# Patient Record
Sex: MALE | Race: WHITE | NOT HISPANIC OR LATINO | Employment: UNEMPLOYED | URBAN - NONMETROPOLITAN AREA
[De-identification: names, ages, dates, MRNs, and addresses within clinical notes are randomized per-mention and may not be internally consistent; named-entity substitution may affect disease eponyms.]

---

## 2019-07-11 ENCOUNTER — TRANSCRIBE ORDERS (OUTPATIENT)
Dept: ADMINISTRATIVE | Facility: HOSPITAL | Age: 59
End: 2019-07-11

## 2019-07-11 DIAGNOSIS — R55 SYNCOPE AND COLLAPSE: Primary | ICD-10-CM

## 2019-07-15 ENCOUNTER — HOSPITAL ENCOUNTER (OUTPATIENT)
Dept: CT IMAGING | Facility: HOSPITAL | Age: 59
Discharge: HOME | End: 2019-07-15

## 2019-07-15 ENCOUNTER — TRANSCRIBE ORDERS (OUTPATIENT)
Dept: ADMINISTRATIVE | Facility: HOSPITAL | Age: 59
End: 2019-07-15

## 2019-07-15 ENCOUNTER — LAB (OUTPATIENT)
Dept: LAB | Facility: HOSPITAL | Age: 59
End: 2019-07-15

## 2019-07-15 ENCOUNTER — HOSPITAL ENCOUNTER (OUTPATIENT)
Dept: CARDIOLOGY | Facility: HOSPITAL | Age: 59
Discharge: HOME | End: 2019-07-15
Admitting: PHYSICIAN ASSISTANT

## 2019-07-15 DIAGNOSIS — E11.9 DIABETES MELLITUS, STABLE (HCC): ICD-10-CM

## 2019-07-15 DIAGNOSIS — I10 ESSENTIAL HYPERTENSION, BENIGN: ICD-10-CM

## 2019-07-15 DIAGNOSIS — I10 ESSENTIAL HYPERTENSION, BENIGN: Primary | ICD-10-CM

## 2019-07-15 DIAGNOSIS — R55 SYNCOPE AND COLLAPSE: ICD-10-CM

## 2019-07-15 LAB — CREAT BLDA-MCNC: 0.8 MG/DL (ref 0.6–1.3)

## 2019-07-15 PROCEDURE — 82565 ASSAY OF CREATININE: CPT

## 2019-07-15 PROCEDURE — 0 IOVERSOL 68 % SOLUTION: Performed by: PHYSICIAN ASSISTANT

## 2019-07-15 PROCEDURE — 36415 COLL VENOUS BLD VENIPUNCTURE: CPT

## 2019-07-15 PROCEDURE — 84443 ASSAY THYROID STIM HORMONE: CPT

## 2019-07-15 PROCEDURE — 85007 BL SMEAR W/DIFF WBC COUNT: CPT

## 2019-07-15 PROCEDURE — 83036 HEMOGLOBIN GLYCOSYLATED A1C: CPT

## 2019-07-15 PROCEDURE — 70470 CT HEAD/BRAIN W/O & W/DYE: CPT

## 2019-07-15 PROCEDURE — 85025 COMPLETE CBC W/AUTO DIFF WBC: CPT

## 2019-07-15 PROCEDURE — 80053 COMPREHEN METABOLIC PANEL: CPT

## 2019-07-15 PROCEDURE — 80061 LIPID PANEL: CPT

## 2019-07-15 PROCEDURE — 70470 CT HEAD/BRAIN W/O & W/DYE: CPT | Performed by: RADIOLOGY

## 2019-07-15 PROCEDURE — 93880 EXTRACRANIAL BILAT STUDY: CPT

## 2019-07-15 PROCEDURE — 93880 EXTRACRANIAL BILAT STUDY: CPT | Performed by: RADIOLOGY

## 2019-07-15 RX ADMIN — IOVERSOL 100 ML: 678 INJECTION INTRA-ARTERIAL; INTRAVENOUS at 13:26

## 2019-07-16 LAB
ALBUMIN SERPL-MCNC: 3.6 G/DL (ref 3.5–5.2)
ALBUMIN/GLOB SERPL: 0.8 G/DL
ALP SERPL-CCNC: 123 U/L (ref 39–117)
ALT SERPL W P-5'-P-CCNC: 39 U/L (ref 1–41)
ANION GAP SERPL CALCULATED.3IONS-SCNC: 12.5 MMOL/L (ref 5–15)
AST SERPL-CCNC: 95 U/L (ref 1–40)
BASOPHILS # BLD MANUAL: 0.05 10*3/MM3 (ref 0–0.2)
BASOPHILS NFR BLD AUTO: 1.1 % (ref 0–1.5)
BILIRUB SERPL-MCNC: 2.3 MG/DL (ref 0.2–1.2)
BUN BLD-MCNC: 11 MG/DL (ref 6–20)
BUN/CREAT SERPL: 13.4 (ref 7–25)
CALCIUM SPEC-SCNC: 10.1 MG/DL (ref 8.6–10.5)
CHLORIDE SERPL-SCNC: 98 MMOL/L (ref 98–107)
CHOLEST SERPL-MCNC: 142 MG/DL (ref 0–200)
CO2 SERPL-SCNC: 27.5 MMOL/L (ref 22–29)
CREAT BLD-MCNC: 0.82 MG/DL (ref 0.76–1.27)
DEPRECATED RDW RBC AUTO: 57.4 FL (ref 37–54)
ERYTHROCYTE [DISTWIDTH] IN BLOOD BY AUTOMATED COUNT: 14.6 % (ref 12.3–15.4)
GFR SERPL CREATININE-BSD FRML MDRD: 96 ML/MIN/1.73
GLOBULIN UR ELPH-MCNC: 4.6 GM/DL
GLUCOSE BLD-MCNC: 144 MG/DL (ref 65–99)
HBA1C MFR BLD: 6 % (ref 4.8–5.6)
HCT VFR BLD AUTO: 43.7 % (ref 37.5–51)
HDLC SERPL-MCNC: 39 MG/DL (ref 40–60)
HGB BLD-MCNC: 14.2 G/DL (ref 13–17.7)
LDLC SERPL CALC-MCNC: 80 MG/DL (ref 0–100)
LDLC/HDLC SERPL: 2.06 {RATIO}
LYMPHOCYTES # BLD MANUAL: 1.63 10*3/MM3 (ref 0.7–3.1)
LYMPHOCYTES NFR BLD MANUAL: 18.1 % (ref 5–12)
LYMPHOCYTES NFR BLD MANUAL: 36.2 % (ref 19.6–45.3)
MCH RBC QN AUTO: 34.4 PG (ref 26.6–33)
MCHC RBC AUTO-ENTMCNC: 32.5 G/DL (ref 31.5–35.7)
MCV RBC AUTO: 105.8 FL (ref 79–97)
MONOCYTES # BLD AUTO: 0.81 10*3/MM3 (ref 0.1–0.9)
NEUTROPHILS # BLD AUTO: 2.01 10*3/MM3 (ref 1.7–7)
NEUTROPHILS NFR BLD MANUAL: 44.7 % (ref 42.7–76)
PLAT MORPH BLD: NORMAL
PLATELET # BLD AUTO: 66 10*3/MM3 (ref 140–450)
PMV BLD AUTO: 12.5 FL (ref 6–12)
POTASSIUM BLD-SCNC: 4 MMOL/L (ref 3.5–5.2)
PROT SERPL-MCNC: 8.2 G/DL (ref 6–8.5)
RBC # BLD AUTO: 4.13 10*6/MM3 (ref 4.14–5.8)
RBC MORPH BLD: NORMAL
SODIUM BLD-SCNC: 138 MMOL/L (ref 136–145)
TRIGL SERPL-MCNC: 114 MG/DL (ref 0–150)
TSH SERPL DL<=0.05 MIU/L-ACNC: 1.91 MIU/ML (ref 0.27–4.2)
VLDLC SERPL-MCNC: 22.8 MG/DL (ref 5–40)
WBC MORPH BLD: NORMAL
WBC NRBC COR # BLD: 4.49 10*3/MM3 (ref 3.4–10.8)

## 2019-08-05 ENCOUNTER — OFFICE VISIT (OUTPATIENT)
Dept: CARDIOLOGY | Facility: CLINIC | Age: 59
End: 2019-08-05

## 2019-08-05 VITALS
DIASTOLIC BLOOD PRESSURE: 87 MMHG | WEIGHT: 202.2 LBS | SYSTOLIC BLOOD PRESSURE: 159 MMHG | HEART RATE: 85 BPM | HEIGHT: 67 IN | BODY MASS INDEX: 31.74 KG/M2 | RESPIRATION RATE: 18 BRPM | OXYGEN SATURATION: 97 %

## 2019-08-05 DIAGNOSIS — R07.2 PRECORDIAL PAIN: Primary | ICD-10-CM

## 2019-08-05 DIAGNOSIS — E11.9 TYPE 2 DIABETES MELLITUS WITHOUT COMPLICATION, WITHOUT LONG-TERM CURRENT USE OF INSULIN (HCC): ICD-10-CM

## 2019-08-05 DIAGNOSIS — R01.1 HEART MURMUR: ICD-10-CM

## 2019-08-05 DIAGNOSIS — R06.09 DYSPNEA ON EXERTION: ICD-10-CM

## 2019-08-05 DIAGNOSIS — I10 ESSENTIAL HYPERTENSION: ICD-10-CM

## 2019-08-05 PROCEDURE — 99204 OFFICE O/P NEW MOD 45 MIN: CPT | Performed by: INTERNAL MEDICINE

## 2019-08-05 PROCEDURE — 93000 ELECTROCARDIOGRAM COMPLETE: CPT | Performed by: INTERNAL MEDICINE

## 2019-08-05 RX ORDER — ATORVASTATIN CALCIUM 10 MG/1
TABLET, FILM COATED ORAL
Refills: 3 | COMMUNITY
Start: 2019-07-12 | End: 2019-08-27 | Stop reason: SDUPTHER

## 2019-08-05 RX ORDER — LISINOPRIL 20 MG/1
TABLET ORAL
Refills: 3 | COMMUNITY
Start: 2019-07-12 | End: 2019-12-03 | Stop reason: SDUPTHER

## 2019-08-05 RX ORDER — PROPRANOLOL HYDROCHLORIDE 10 MG/1
10 TABLET ORAL 2 TIMES DAILY
Qty: 60 TABLET | Refills: 3 | Status: SHIPPED | OUTPATIENT
Start: 2019-08-05 | End: 2020-02-06

## 2019-08-05 NOTE — PROGRESS NOTES
Darrell Guzman PA  Darrell Conteh  1960  08/05/2019    Patient Active Problem List   Diagnosis   • Precordial pain with some typical and some atypical features for angina pectoris.   • Dyspnea on exertion (NYHA class III)   • Heart murmur (systolic)   • Essential hypertension with elevated systolic blood pressure.   • Type 2 diabetes mellitus without complication, without long-term current use of insulin (CMS/[facility])       Dear Darrell:    Subjective     Darrell Conteh is a 59 y.o. male with the problems as listed above, presents    Chief complaint: Recent diagnosis of heart murmur and intermittent chest pains and shortness of breath.    History of Present Illness: Mr. Conteh is a pleasant 5-year-old  male with no history of known coronary artery disease, has history of hypertension and type 2 diabetes mellitus, has been noncompliant with taking his medications, has history of smoking of about 1 to 2 packs a day and has been a smoker for 40 years, history of alcohol abuse of 6 to 12 cans of beer a day for the last 35 to 40 years, presents with complaints of having some intermittent chest pains and shortness of breath.  He described the chest pains as being felt as sharp pains and sometimes burning type of sensation in the substernal region associated with some shortness of breath.  These chest pain seem to occur with and without exertion and relieved spontaneously.  There are mild to moderate intensity.  He was recently diagnosed to have a heart murmur by his PCP.  He denies any history of rheumatic heart disease.  No history of known valvular disease.  He has dyspnea with mild to moderate exertion with no PND or orthopnea.  He has some bilateral leg edema.  He does have a family history of premature coronary artery disease with his father having had heart attack in his 30s.  There is family history of strokes as well.    Cardiac risk factors:diabetes mellitus, hypertension, smoking, Positive family Hx. of premature  athersclerotivc disease., Sedentary life style, Obesity and Age and male gender.    Allergies   Allergen Reactions   • Morphine Other (See Comments)     PATIENT STATES FLATLINED       Current Outpatient Medications:   •  atorvastatin (LIPITOR) 10 MG tablet, , Disp: , Rfl: 3  •  lisinopril (PRINIVIL,ZESTRIL) 20 MG tablet, , Disp: , Rfl: 3  •  metFORMIN (GLUCOPHAGE) 500 MG tablet, , Disp: , Rfl: 3  •  propranolol (INDERAL) 10 MG tablet, Take 1 tablet by mouth 2 (Two) Times a Day., Disp: 60 tablet, Rfl: 3    Past Medical History:   Diagnosis Date   • Diabetes mellitus (CMS/HCC)    • Heart murmur    • Hypertension      History reviewed. No pertinent surgical history.  Family History   Problem Relation Age of Onset   • Alzheimer's disease Mother    • Heart disease Father    • Diabetes Father    • Stroke Father      Social History     Tobacco Use   • Smoking status: Current Every Day Smoker     Packs/day: 1.00     Years: 40.00     Pack years: 40.00     Types: Cigarettes   • Smokeless tobacco: Never Used   Substance Use Topics   • Alcohol use: Yes     Frequency: Never   • Drug use: No     Review of Systems   Constitution: Negative for chills, diaphoresis and fever.   HENT: Positive for tinnitus.    Eyes: Negative.    Cardiovascular: Negative for chest pain, leg swelling, orthopnea, palpitations and paroxysmal nocturnal dyspnea.   Respiratory: Negative for cough, hemoptysis and shortness of breath.    Endocrine: Negative for cold intolerance and heat intolerance.   Hematologic/Lymphatic: Does not bruise/bleed easily.   Skin: Negative for itching and rash.   Musculoskeletal: Positive for back pain, joint pain, joint swelling and muscle weakness. Negative for myalgias.   Gastrointestinal: Positive for change in bowel habit, diarrhea and heartburn. Negative for abdominal pain, constipation, nausea and vomiting.   Genitourinary: Positive for frequency. Negative for dysuria and hematuria.   Neurological: Positive for tremors.  "Negative for dizziness, focal weakness and numbness.   Psychiatric/Behavioral: Positive for memory loss.   Allergic/Immunologic: Negative.        Objective   Blood pressure 159/87, pulse 85, resp. rate 18, height 170.2 cm (67\"), weight 91.7 kg (202 lb 3.2 oz), SpO2 97 %.  Body mass index is 31.67 kg/m².      Physical Exam   Constitutional: He is oriented to person, place, and time. He appears well-developed and well-nourished.   HENT:   Mouth/Throat: Oropharynx is clear and moist.   Eyes: EOM are normal. Pupils are equal, round, and reactive to light.   Neck: Neck supple. No JVD present. No tracheal deviation present. No thyromegaly present.   Cardiovascular: Normal rate, regular rhythm, S1 normal and S2 normal. Exam reveals no gallop, no S3, no S4 and no friction rub.   Murmur heard.   Systolic murmur is present with a grade of 3/6 at the lower left sternal border.  Pulses:       Dorsalis pedis pulses are 1+ on the right side, and 1+ on the left side.        Posterior tibial pulses are 1+ on the right side, and 1+ on the left side.   Pulmonary/Chest: Effort normal and breath sounds normal.   Abdominal: Soft. Bowel sounds are normal. He exhibits no mass. There is no tenderness.   Musculoskeletal: Normal range of motion. He exhibits edema (1+ edema on both legs.).   Lymphadenopathy:     He has no cervical adenopathy.   Neurological: He is alert and oriented to person, place, and time.   Skin: Skin is warm and dry. No rash noted.   Psychiatric: He has a normal mood and affect.       Lab Results   Component Value Date     07/15/2019    K 4.0 07/15/2019    CL 98 07/15/2019    CO2 27.5 07/15/2019    BUN 11 07/15/2019    CREATININE 0.80 07/15/2019    GLUCOSE 144 (H) 07/15/2019    CALCIUM 10.1 07/15/2019    AST 95 (H) 07/15/2019    ALT 39 07/15/2019    ALKPHOS 123 (H) 07/15/2019     No results found for: CKTOTAL  Lab Results   Component Value Date    WBC 4.49 07/15/2019    HGB 14.2 07/15/2019    HCT 43.7 07/15/2019 "    PLT 66 (L) 07/15/2019     No results found for: INR  No results found for: MG  Lab Results   Component Value Date    TSH 1.910 07/15/2019    TRIG 114 07/15/2019    HDL 39 (L) 07/15/2019    LDL 80 07/15/2019        ECG 12 Lead  Date/Time: 8/5/2019 8:53 AM  Performed by: Owen Vazquez MD  Authorized by: Owen Vazquez MD   Previous ECG: no previous ECG available  Rhythm: sinus rhythm  Conduction: conduction normal  ST Segments: ST segments normal  Comments: Possible old inferior wall myocardial infarction.            Assessment/Plan :   Diagnosis Plan   1. Precordial pain with some typical and some atypical features for angina pectoris.  Stress Test With Myocardial Perfusion   2. Dyspnea on exertion (NYHA class III)  Adult Transthoracic Echo Complete   3. Heart murmur (systolic)  Adult Transthoracic Echo Complete   4. Essential hypertension with elevated systolic blood pressure.     5. Type 2 diabetes mellitus without complication, without long-term current use of insulin.      Recommendations:    Orders Placed This Encounter   Procedures   • Stress Test With Myocardial Perfusion (1 Day)   • ECG 12 Lead   • Adult Transthoracic Echo Complete W/ Cont if Necessary Per Protocol        1. I have also strongly emphasized for him to quit smoking and drinking alcohol.  2. We will evaluate him further with stress sestamibi study and echo Doppler study.  3. Continue with lisinopril and add propranolol 10 mg p.o. twice daily.    Return in about 4 weeks (around 9/2/2019).    As always, Darrell Guzman, PA  I appreciate very much the opportunity to participate in the cardiovascular care of your patients. Please do not hesitate to call me with any questions with regards to Darrell Conteh evaluation and management.       With Best Regards,        Owen Vazquez MD, FACC    Please note that portions of this note were completed with a voice recognition program.

## 2019-08-08 ENCOUNTER — TRANSCRIBE ORDERS (OUTPATIENT)
Dept: ADMINISTRATIVE | Facility: HOSPITAL | Age: 59
End: 2019-08-08

## 2019-08-08 DIAGNOSIS — R94.5 NONSPECIFIC ABNORMAL RESULTS OF LIVER FUNCTION STUDY: Primary | ICD-10-CM

## 2019-08-13 ENCOUNTER — HOSPITAL ENCOUNTER (OUTPATIENT)
Dept: ULTRASOUND IMAGING | Facility: HOSPITAL | Age: 59
Discharge: HOME | End: 2019-08-13
Admitting: PHYSICIAN ASSISTANT

## 2019-08-13 DIAGNOSIS — R94.5 NONSPECIFIC ABNORMAL RESULTS OF LIVER FUNCTION STUDY: ICD-10-CM

## 2019-08-13 PROCEDURE — 76705 ECHO EXAM OF ABDOMEN: CPT | Performed by: RADIOLOGY

## 2019-08-13 PROCEDURE — 76705 ECHO EXAM OF ABDOMEN: CPT

## 2019-08-20 ENCOUNTER — HOSPITAL ENCOUNTER (OUTPATIENT)
Dept: CARDIOLOGY | Facility: HOSPITAL | Age: 59
Discharge: HOME | End: 2019-08-20
Admitting: INTERNAL MEDICINE

## 2019-08-20 ENCOUNTER — HOSPITAL ENCOUNTER (OUTPATIENT)
Dept: NUCLEAR MEDICINE | Facility: HOSPITAL | Age: 59
Discharge: HOME | End: 2019-08-20

## 2019-08-20 ENCOUNTER — HOSPITAL ENCOUNTER (OUTPATIENT)
Dept: CARDIOLOGY | Facility: HOSPITAL | Age: 59
Discharge: HOME | End: 2019-08-20

## 2019-08-20 DIAGNOSIS — R07.2 PRECORDIAL PAIN: ICD-10-CM

## 2019-08-20 DIAGNOSIS — R06.09 DYSPNEA ON EXERTION: ICD-10-CM

## 2019-08-20 DIAGNOSIS — R01.1 HEART MURMUR: ICD-10-CM

## 2019-08-20 LAB
BH CV NUCLEAR PRIOR STUDY: 3
BH CV STRESS BP STAGE 1: NORMAL
BH CV STRESS BP STAGE 2: NORMAL
BH CV STRESS COMMENTS STAGE 1: NORMAL
BH CV STRESS COMMENTS STAGE 2: NORMAL
BH CV STRESS DOSE REGADENOSON STAGE 1: 0.4
BH CV STRESS DURATION MIN STAGE 1: 0
BH CV STRESS DURATION MIN STAGE 2: 4
BH CV STRESS DURATION SEC STAGE 1: 10
BH CV STRESS DURATION SEC STAGE 2: 0
BH CV STRESS HR STAGE 1: 99
BH CV STRESS HR STAGE 2: 100
BH CV STRESS PROTOCOL 1: NORMAL
BH CV STRESS RECOVERY BP: NORMAL MMHG
BH CV STRESS RECOVERY HR: 76 BPM
BH CV STRESS STAGE 1: 1
BH CV STRESS STAGE 2: 2
LV EF NUC BP: 67 %
MAXIMAL PREDICTED HEART RATE: 160 BPM
PERCENT MAX PREDICTED HR: 62.5 %
STRESS BASELINE BP: NORMAL MMHG
STRESS BASELINE HR: 73 BPM
STRESS PERCENT HR: 74 %
STRESS POST ESTIMATED WORKLOAD: 4.6 METS
STRESS POST EXERCISE DUR MIN: 3 MIN
STRESS POST EXERCISE DUR SEC: 7 SEC
STRESS POST PEAK BP: NORMAL MMHG
STRESS POST PEAK HR: 100 BPM
STRESS TARGET HR: 136 BPM

## 2019-08-20 PROCEDURE — 93017 CV STRESS TEST TRACING ONLY: CPT

## 2019-08-20 PROCEDURE — 78452 HT MUSCLE IMAGE SPECT MULT: CPT

## 2019-08-20 PROCEDURE — 25010000002 REGADENOSON 0.4 MG/5ML SOLUTION: Performed by: INTERNAL MEDICINE

## 2019-08-20 PROCEDURE — 78452 HT MUSCLE IMAGE SPECT MULT: CPT | Performed by: INTERNAL MEDICINE

## 2019-08-20 PROCEDURE — 0 TECHNETIUM SESTAMIBI: Performed by: INTERNAL MEDICINE

## 2019-08-20 PROCEDURE — 93018 CV STRESS TEST I&R ONLY: CPT | Performed by: INTERNAL MEDICINE

## 2019-08-20 PROCEDURE — 93306 TTE W/DOPPLER COMPLETE: CPT

## 2019-08-20 PROCEDURE — 93306 TTE W/DOPPLER COMPLETE: CPT | Performed by: INTERNAL MEDICINE

## 2019-08-20 PROCEDURE — A9500 TC99M SESTAMIBI: HCPCS | Performed by: INTERNAL MEDICINE

## 2019-08-20 RX ADMIN — REGADENOSON 0.4 MG: 0.08 INJECTION, SOLUTION INTRAVENOUS at 09:37

## 2019-08-20 RX ADMIN — TECHNETIUM TC 99M SESTAMIBI 1 DOSE: 1 INJECTION INTRAVENOUS at 09:37

## 2019-08-20 RX ADMIN — TECHNETIUM TC 99M SESTAMIBI 1 DOSE: 1 INJECTION INTRAVENOUS at 09:08

## 2019-08-21 LAB
BH CV ECHO MEAS - % IVS THICK: 41.6 %
BH CV ECHO MEAS - % LVPW THICK: 134.4 %
BH CV ECHO MEAS - ACS: 1.6 CM
BH CV ECHO MEAS - AO ROOT AREA (BSA CORRECTED): 1.5
BH CV ECHO MEAS - AO ROOT AREA: 7.5 CM^2
BH CV ECHO MEAS - AO ROOT DIAM: 3.1 CM
BH CV ECHO MEAS - BSA(HAYCOCK): 2.1 M^2
BH CV ECHO MEAS - BSA: 2 M^2
BH CV ECHO MEAS - BZI_BMI: 31.6 KILOGRAMS/M^2
BH CV ECHO MEAS - BZI_METRIC_HEIGHT: 170.2 CM
BH CV ECHO MEAS - BZI_METRIC_WEIGHT: 91.6 KG
BH CV ECHO MEAS - EDV(CUBED): 118 ML
BH CV ECHO MEAS - EDV(MOD-SP4): 68 ML
BH CV ECHO MEAS - EDV(TEICH): 113.1 ML
BH CV ECHO MEAS - EF(CUBED): 79.1 %
BH CV ECHO MEAS - EF(MOD-SP4): 72.1 %
BH CV ECHO MEAS - EF(TEICH): 71.3 %
BH CV ECHO MEAS - ESV(CUBED): 24.6 ML
BH CV ECHO MEAS - ESV(MOD-SP4): 19 ML
BH CV ECHO MEAS - ESV(TEICH): 32.5 ML
BH CV ECHO MEAS - FS: 40.7 %
BH CV ECHO MEAS - IVS/LVPW: 1.1
BH CV ECHO MEAS - IVSD: 1.2 CM
BH CV ECHO MEAS - IVSS: 1.6 CM
BH CV ECHO MEAS - LA DIMENSION: 4.7 CM
BH CV ECHO MEAS - LA/AO: 1.5
BH CV ECHO MEAS - LV DIASTOLIC VOL/BSA (35-75): 33.5 ML/M^2
BH CV ECHO MEAS - LV MASS(C)D: 197.6 GRAMS
BH CV ECHO MEAS - LV MASS(C)DI: 97.3 GRAMS/M^2
BH CV ECHO MEAS - LV MASS(C)S: 257 GRAMS
BH CV ECHO MEAS - LV MASS(C)SI: 126.6 GRAMS/M^2
BH CV ECHO MEAS - LV SYSTOLIC VOL/BSA (12-30): 9.4 ML/M^2
BH CV ECHO MEAS - LVIDD: 4.9 CM
BH CV ECHO MEAS - LVIDS: 2.9 CM
BH CV ECHO MEAS - LVLD AP4: 8.1 CM
BH CV ECHO MEAS - LVLS AP4: 6 CM
BH CV ECHO MEAS - LVOT AREA (M): 2.5 CM^2
BH CV ECHO MEAS - LVOT AREA: 2.5 CM^2
BH CV ECHO MEAS - LVOT DIAM: 1.8 CM
BH CV ECHO MEAS - LVPWD: 1 CM
BH CV ECHO MEAS - LVPWS: 2.4 CM
BH CV ECHO MEAS - MV A MAX VEL: 64.7 CM/SEC
BH CV ECHO MEAS - MV E MAX VEL: 121 CM/SEC
BH CV ECHO MEAS - MV E/A: 1.9
BH CV ECHO MEAS - PA ACC SLOPE: 1294 CM/SEC^2
BH CV ECHO MEAS - PA ACC TIME: 0.1 SEC
BH CV ECHO MEAS - PA PR(ACCEL): 34.5 MMHG
BH CV ECHO MEAS - RAP SYSTOLE: 10 MMHG
BH CV ECHO MEAS - RVDD: 2.4 CM
BH CV ECHO MEAS - RVSP: 44.3 MMHG
BH CV ECHO MEAS - SI(CUBED): 46 ML/M^2
BH CV ECHO MEAS - SI(MOD-SP4): 24.1 ML/M^2
BH CV ECHO MEAS - SI(TEICH): 39.7 ML/M^2
BH CV ECHO MEAS - SV(CUBED): 93.4 ML
BH CV ECHO MEAS - SV(MOD-SP4): 49 ML
BH CV ECHO MEAS - SV(TEICH): 80.6 ML
BH CV ECHO MEAS - TR MAX VEL: 293 CM/SEC
MAXIMAL PREDICTED HEART RATE: 160 BPM
STRESS TARGET HR: 136 BPM

## 2019-08-22 ENCOUNTER — TELEPHONE (OUTPATIENT)
Dept: CARDIOLOGY | Facility: CLINIC | Age: 59
End: 2019-08-22

## 2019-08-22 NOTE — TELEPHONE ENCOUNTER
Notes recorded by Kamila Morris APRN on 8/21/2019 at 9:31 AM EDT  Follow up within the next week to discuss abnormal stress test.      Notes recorded by Cristine Townsend CMA on 8/22/2019 at 9:00 AM EDT  Sooner appt per Kamila, date and time 8/27/19 @ 9AM.  Called pt, no answer and no VM set up.

## 2019-08-27 ENCOUNTER — OFFICE VISIT (OUTPATIENT)
Dept: CARDIOLOGY | Facility: CLINIC | Age: 59
End: 2019-08-27

## 2019-08-27 VITALS
SYSTOLIC BLOOD PRESSURE: 157 MMHG | HEART RATE: 75 BPM | HEIGHT: 67 IN | OXYGEN SATURATION: 98 % | WEIGHT: 208 LBS | BODY MASS INDEX: 32.65 KG/M2 | DIASTOLIC BLOOD PRESSURE: 80 MMHG

## 2019-08-27 DIAGNOSIS — E78.5 DYSLIPIDEMIA: ICD-10-CM

## 2019-08-27 DIAGNOSIS — E11.9 TYPE 2 DIABETES MELLITUS WITHOUT COMPLICATION, WITHOUT LONG-TERM CURRENT USE OF INSULIN (HCC): ICD-10-CM

## 2019-08-27 DIAGNOSIS — R07.2 PRECORDIAL PAIN: ICD-10-CM

## 2019-08-27 DIAGNOSIS — R94.39 ABNORMAL NUCLEAR STRESS TEST: ICD-10-CM

## 2019-08-27 DIAGNOSIS — I10 ESSENTIAL HYPERTENSION: Primary | ICD-10-CM

## 2019-08-27 PROCEDURE — 99214 OFFICE O/P EST MOD 30 MIN: CPT | Performed by: NURSE PRACTITIONER

## 2019-08-27 RX ORDER — ISOSORBIDE MONONITRATE 30 MG/1
30 TABLET, EXTENDED RELEASE ORAL DAILY
Qty: 30 TABLET | Refills: 5 | Status: SHIPPED | OUTPATIENT
Start: 2019-08-27 | End: 2019-09-17 | Stop reason: SDUPTHER

## 2019-08-27 RX ORDER — ATORVASTATIN CALCIUM 20 MG/1
20 TABLET, FILM COATED ORAL NIGHTLY
Qty: 30 TABLET | Refills: 5 | Status: SHIPPED | OUTPATIENT
Start: 2019-08-27

## 2019-08-27 NOTE — PROGRESS NOTES
Darrell Guzman PA  Darrell Conteh  1960  08/27/2019    Patient Active Problem List   Diagnosis   • Precordial pain with some typical and some atypical features for angina pectoris.   • Dyspnea on exertion (NYHA class III)   • Heart murmur (systolic)   • Essential hypertension with elevated systolic blood pressure.   • Type 2 diabetes mellitus without complication, without long-term current use of insulin (CMS/[facility])   • Abnormal nuclear stress test       Dear Darrell Guzman PA:    Subjective     Chief Complaint   Patient presents with   • Precordial pain with some typical and some atypical features           History of Present Illness:    Darrell Conteh is a 59 y.o. male with a history of hypertension, dyslipidemia, diabetes mellitus type 2, tobacco abuse, and family history of premature coronary artery disease.  He presents today for routine cardiology follow-up.  Initially, he was having frequent chest pains.  He was evaluated further with a Lexiscan stress test and echocardiogram.  The stress test revealed a medium size, mild degree of ischemia in the inferior wall consistent with an intermediate risk study.  Echocardiogram revealed a normal EF with no significant valvular abnormalities.  He reports he has occasional chest pains.  These are described as a pressure in the substernal region radiating to the left chest wall.  He has no associated symptoms.  This usually lasts for a few seconds.  He cannot identify any aggravating or alleviating factors.  He is unable to tell me how often this occurs.  He continues to smoke.          Allergies   Allergen Reactions   • Morphine Other (See Comments)     PATIENT STATES FLATLINED   :      Current Outpatient Medications:   •  atorvastatin (LIPITOR) 20 MG tablet, Take 1 tablet by mouth Every Night., Disp: 30 tablet, Rfl: 5  •  lisinopril (PRINIVIL,ZESTRIL) 20 MG tablet, , Disp: , Rfl: 3  •  metFORMIN (GLUCOPHAGE) 500 MG tablet, , Disp: , Rfl: 3  •  propranolol (INDERAL) 10 MG  "tablet, Take 1 tablet by mouth 2 (Two) Times a Day., Disp: 60 tablet, Rfl: 3  •  isosorbide mononitrate (IMDUR) 30 MG 24 hr tablet, Take 1 tablet by mouth Daily., Disp: 30 tablet, Rfl: 5      The following portions of the patient's history were reviewed and updated as appropriate: allergies, current medications, past family history, past medical history, past social history, past surgical history and problem list.    Social History     Tobacco Use   • Smoking status: Current Every Day Smoker     Packs/day: 1.00     Years: 40.00     Pack years: 40.00     Types: Cigarettes   • Smokeless tobacco: Never Used   Substance Use Topics   • Alcohol use: Yes     Frequency: Never   • Drug use: No       Review of Systems   Constitution: Negative for weakness and malaise/fatigue.   Cardiovascular: Positive for chest pain. Negative for dyspnea on exertion, irregular heartbeat, leg swelling, near-syncope, orthopnea, palpitations, paroxysmal nocturnal dyspnea and syncope.   Respiratory: Negative for cough, shortness of breath and wheezing.    Neurological: Negative for dizziness and light-headedness.       Objective   Vitals:    08/27/19 0904   BP: 157/80   Pulse: 75   SpO2: 98%   Weight: 94.3 kg (208 lb)   Height: 170.2 cm (67.01\")     Body mass index is 32.57 kg/m².        Physical Exam   Constitutional: He is oriented to person, place, and time. He appears well-developed and well-nourished.   HENT:   Head: Normocephalic and atraumatic.   Cardiovascular: Normal rate, regular rhythm and normal heart sounds. Exam reveals no S3 and no S4.   No murmur heard.  Pulmonary/Chest: Effort normal and breath sounds normal. He has no wheezes. He has no rales.   Abdominal: Soft. Bowel sounds are normal.   Musculoskeletal: He exhibits no edema.   Neurological: He is alert and oriented to person, place, and time.   Skin: Skin is warm and dry.   Psychiatric: He has a normal mood and affect. His behavior is normal.       Lab Results   Component " Value Date     07/15/2019    K 4.0 07/15/2019    CL 98 07/15/2019    CO2 27.5 07/15/2019    BUN 11 07/15/2019    CREATININE 0.80 07/15/2019    GLUCOSE 144 (H) 07/15/2019    CALCIUM 10.1 07/15/2019    AST 95 (H) 07/15/2019    ALT 39 07/15/2019    ALKPHOS 123 (H) 07/15/2019       Lab Results   Component Value Date    WBC 4.49 07/15/2019    HGB 14.2 07/15/2019    HCT 43.7 07/15/2019    PLT 66 (L) 07/15/2019       Lab Results   Component Value Date    TSH 1.910 07/15/2019    TRIG 114 07/15/2019    HDL 39 (L) 07/15/2019    LDL 80 07/15/2019            Procedures      Assessment/Plan    Diagnosis Plan   1. Essential hypertension with elevated systolic blood pressure.     2. Type 2 diabetes mellitus without complication, without long-term current use of insulin (CMS/[facility])     3. Precordial pain with some typical and some atypical features for angina pectoris.  atorvastatin (LIPITOR) 20 MG tablet    isosorbide mononitrate (IMDUR) 30 MG 24 hr tablet   4. Abnormal nuclear stress test  atorvastatin (LIPITOR) 20 MG tablet    isosorbide mononitrate (IMDUR) 30 MG 24 hr tablet   5. Dyslipidemia  atorvastatin (LIPITOR) 20 MG tablet                Recommendations:    1.  I have discussed stress test results and echocardiogram results with the patient at length today.    2.  Given his abnormal stress test with persistent chest pains and multiple cardiac risk factors I have recommended cardiac catheterization.  However, the patient has declined at this time.  He is wanting to try medical management for now.    3.  We will continue with lisinopril and propranolol.    4.  His recent LDL was not at goal, will increase atorvastatin to 20 mg every evening.    5.  Start  isosorbide 30 mg daily.    6.  I did give him literature regarding cardiac catheterizations for him to review at home and discussed the procedure with him at length today.    7.  Follow-up in 3 weeks and if needed.        Return in about 3 weeks (around 9/17/2019) for  Recheck.    As always, I appreciate very much the opportunity to participate in the cardiovascular care of your patients.      With Best Regards,    DEBRA Flores

## 2019-09-17 ENCOUNTER — OFFICE VISIT (OUTPATIENT)
Dept: CARDIOLOGY | Facility: CLINIC | Age: 59
End: 2019-09-17

## 2019-09-17 VITALS
OXYGEN SATURATION: 98 % | HEART RATE: 78 BPM | BODY MASS INDEX: 32.96 KG/M2 | HEIGHT: 67 IN | WEIGHT: 210 LBS | SYSTOLIC BLOOD PRESSURE: 156 MMHG | DIASTOLIC BLOOD PRESSURE: 80 MMHG

## 2019-09-17 DIAGNOSIS — I10 ESSENTIAL HYPERTENSION: Primary | ICD-10-CM

## 2019-09-17 DIAGNOSIS — E78.5 DYSLIPIDEMIA: ICD-10-CM

## 2019-09-17 DIAGNOSIS — E11.9 TYPE 2 DIABETES MELLITUS WITHOUT COMPLICATION, WITHOUT LONG-TERM CURRENT USE OF INSULIN (HCC): ICD-10-CM

## 2019-09-17 DIAGNOSIS — R94.39 ABNORMAL NUCLEAR STRESS TEST: ICD-10-CM

## 2019-09-17 DIAGNOSIS — R07.2 PRECORDIAL PAIN: ICD-10-CM

## 2019-09-17 PROCEDURE — 99213 OFFICE O/P EST LOW 20 MIN: CPT | Performed by: NURSE PRACTITIONER

## 2019-09-17 RX ORDER — ISOSORBIDE MONONITRATE 60 MG/1
60 TABLET, EXTENDED RELEASE ORAL DAILY
Qty: 30 TABLET | Refills: 5 | Status: SHIPPED | OUTPATIENT
Start: 2019-09-17

## 2019-09-17 NOTE — PROGRESS NOTES
Darrell Guzman PA  Darrell Conteh  1960  09/17/2019    Patient Active Problem List   Diagnosis   • Precordial pain with some typical and some atypical features for angina pectoris.   • Dyspnea on exertion (NYHA class III)   • Heart murmur (systolic)   • Essential hypertension with elevated systolic blood pressure.   • Type 2 diabetes mellitus without complication, without long-term current use of insulin (CMS/[facility])   • Abnormal nuclear stress test       Dear Darrell Guzman PA:    Subjective     Chief Complaint   Patient presents with   • Hypertension     3 week follow up           History of Present Illness:    Darrell Conteh is a 59 y.o. male with a history of hypertension, dyslipidemia, diabetes mellitus type 2, tobacco abuse, and family history of premature coronary artery disease.  He initially presented with chest pains.  He was evaluated with a Lexiscan stress test which revealed medium size, mild degree of ischemia in the inferior wall.  An echocardiogram revealed normal EF with no significant valvular abnormalities.  Cardiac catheterization was recommended.  However, the patient declined.  Therefore, he was treated medically.  He reports since starting isosorbide his chest pains have markedly improved.  He only recalls a couple of mild episodes of chest pain since his last visit.  He has some chronic shortness of breath with mild to moderate exertion which is not recently changed.  He also denies palpitations, dizziness, lightheadedness, near-syncope, or syncope.  He has not been taking an aspirin daily.        Allergies   Allergen Reactions   • Morphine Other (See Comments)     PATIENT STATES FLATLINED   :      Current Outpatient Medications:   •  atorvastatin (LIPITOR) 20 MG tablet, Take 1 tablet by mouth Every Night., Disp: 30 tablet, Rfl: 5  •  isosorbide mononitrate (IMDUR) 60 MG 24 hr tablet, Take 1 tablet by mouth Daily., Disp: 30 tablet, Rfl: 5  •  lisinopril (PRINIVIL,ZESTRIL) 20 MG tablet, , Disp: , Rfl:  "3  •  metFORMIN (GLUCOPHAGE) 500 MG tablet, , Disp: , Rfl: 3  •  propranolol (INDERAL) 10 MG tablet, Take 1 tablet by mouth 2 (Two) Times a Day., Disp: 60 tablet, Rfl: 3      The following portions of the patient's history were reviewed and updated as appropriate: allergies, current medications, past family history, past medical history, past social history, past surgical history and problem list.    Social History     Tobacco Use   • Smoking status: Current Every Day Smoker     Packs/day: 1.00     Years: 40.00     Pack years: 40.00     Types: Cigarettes   • Smokeless tobacco: Never Used   Substance Use Topics   • Alcohol use: Yes     Frequency: Never   • Drug use: No       Review of Systems   Constitution: Negative for weakness and malaise/fatigue.   Cardiovascular: Negative for chest pain, dyspnea on exertion, irregular heartbeat, leg swelling, near-syncope, orthopnea, palpitations, paroxysmal nocturnal dyspnea and syncope.   Respiratory: Negative for cough, shortness of breath and wheezing.    Neurological: Negative for dizziness and light-headedness.       Objective   Vitals:    09/17/19 0846   BP: 156/80   Pulse: 78   SpO2: 98%   Weight: 95.3 kg (210 lb)   Height: 170.2 cm (67.01\")     Body mass index is 32.88 kg/m².        Physical Exam   Constitutional: He is oriented to person, place, and time. He appears well-developed and well-nourished.   HENT:   Head: Normocephalic and atraumatic.   Cardiovascular: Normal rate, regular rhythm and normal heart sounds. Exam reveals no S3 and no S4.   No murmur heard.  Pulmonary/Chest: Effort normal and breath sounds normal. He has no wheezes. He has no rales.   Abdominal: Soft. Bowel sounds are normal.   Musculoskeletal: He exhibits no edema.   Neurological: He is alert and oriented to person, place, and time.   Skin: Skin is warm and dry.   Psychiatric: He has a normal mood and affect. His behavior is normal.       Lab Results   Component Value Date     07/15/2019 "    K 4.0 07/15/2019    CL 98 07/15/2019    CO2 27.5 07/15/2019    BUN 11 07/15/2019    CREATININE 0.80 07/15/2019    GLUCOSE 144 (H) 07/15/2019    CALCIUM 10.1 07/15/2019    AST 95 (H) 07/15/2019    ALT 39 07/15/2019    ALKPHOS 123 (H) 07/15/2019        Lab Results   Component Value Date    WBC 4.49 07/15/2019    HGB 14.2 07/15/2019    HCT 43.7 07/15/2019    PLT 66 (L) 07/15/2019        Lab Results   Component Value Date    TSH 1.910 07/15/2019    TRIG 114 07/15/2019    HDL 39 (L) 07/15/2019    LDL 80 07/15/2019         Procedures      Assessment/Plan    Diagnosis Plan   1. Essential hypertension with elevated systolic blood pressure.     2. Type 2 diabetes mellitus without complication, without long-term current use of insulin (CMS/[facility])     3. Precordial pain with some typical and some atypical features for angina pectoris.  isosorbide mononitrate (IMDUR) 60 MG 24 hr tablet   4. Abnormal nuclear stress test  isosorbide mononitrate (IMDUR) 60 MG 24 hr tablet   5. Dyslipidemia                  Recommendations:    1. Will increase Imdur to 60 mg daily.    2. I have stressed the importance of taking an aspirin 81 mg daily.    3. Continue propranolol, lisinopril, and atorvastatin.    4. Follow up in 2 months and PRN.      Return in about 2 months (around 11/17/2019) for Recheck.    As always, I appreciate very much the opportunity to participate in the cardiovascular care of your patients.      With Best Regards,    DEBRA Flores

## 2019-12-03 ENCOUNTER — OFFICE VISIT (OUTPATIENT)
Dept: CARDIOLOGY | Facility: CLINIC | Age: 59
End: 2019-12-03

## 2019-12-03 VITALS
BODY MASS INDEX: 31.71 KG/M2 | SYSTOLIC BLOOD PRESSURE: 168 MMHG | WEIGHT: 202 LBS | HEIGHT: 67 IN | RESPIRATION RATE: 16 BRPM | HEART RATE: 90 BPM | DIASTOLIC BLOOD PRESSURE: 91 MMHG

## 2019-12-03 DIAGNOSIS — I10 ESSENTIAL HYPERTENSION: Primary | ICD-10-CM

## 2019-12-03 DIAGNOSIS — R07.2 PRECORDIAL PAIN: ICD-10-CM

## 2019-12-03 DIAGNOSIS — E11.9 TYPE 2 DIABETES MELLITUS WITHOUT COMPLICATION, WITHOUT LONG-TERM CURRENT USE OF INSULIN (HCC): ICD-10-CM

## 2019-12-03 DIAGNOSIS — R94.39 ABNORMAL NUCLEAR STRESS TEST: ICD-10-CM

## 2019-12-03 PROCEDURE — 99214 OFFICE O/P EST MOD 30 MIN: CPT | Performed by: NURSE PRACTITIONER

## 2019-12-03 RX ORDER — RANOLAZINE 500 MG/1
500 TABLET, EXTENDED RELEASE ORAL 2 TIMES DAILY
Qty: 60 TABLET | Refills: 5 | Status: SHIPPED | OUTPATIENT
Start: 2019-12-03

## 2019-12-03 RX ORDER — LISINOPRIL 40 MG/1
40 TABLET ORAL DAILY
Qty: 30 TABLET | Refills: 5 | Status: SHIPPED | OUTPATIENT
Start: 2019-12-03

## 2019-12-03 NOTE — PROGRESS NOTES
Darrell Guzman PA  Darrell Conteh  1960  12/03/2019    Patient Active Problem List   Diagnosis   • Precordial pain with some typical and some atypical features for angina pectoris.   • Dyspnea on exertion (NYHA class III)   • Heart murmur (systolic)   • Essential hypertension with elevated systolic blood pressure.   • Type 2 diabetes mellitus without complication, without long-term current use of insulin (CMS/[facility])   • Abnormal nuclear stress test       Dear Darrell Guzman PA:    Subjective     Chief Complaint   Patient presents with   • Chest Pain   • Hypertension           History of Present Illness:    Darrell Conteh is a 59 y.o. male with a history of hypertension, dyslipidemia, diabetes mellitus type 2, tobacco abuse, and family history of premature coronary artery disease.  He presents today for routine cardiology follow-up.  He initially presented with chest pains and was evaluated with a Lexiscan stress test which revealed a medium size, mild degree of ischemia in the inferior wall.  Since that time, the patient is continued to have chest pains.  These typically occur with exertion and are alleviated with rest.  This is located in the substernal region.  He has previously refused cardiac catheterization and has been medically managed.  He continues to have chest pain and weakness.  He reports his activity level has been limited.  He denies any shortness of breath, palpitations, dizziness, or lightheadedness.  His blood pressure has been elevated.  He has not taken any of his medications this morning.          Allergies   Allergen Reactions   • Morphine Other (See Comments)     PATIENT STATES FLATLINED   :      Current Outpatient Medications:   •  atorvastatin (LIPITOR) 20 MG tablet, Take 1 tablet by mouth Every Night., Disp: 30 tablet, Rfl: 5  •  isosorbide mononitrate (IMDUR) 60 MG 24 hr tablet, Take 1 tablet by mouth Daily., Disp: 30 tablet, Rfl: 5  •  lisinopril (PRINIVIL,ZESTRIL) 40 MG tablet, Take 1 tablet by  "mouth Daily., Disp: 30 tablet, Rfl: 5  •  metFORMIN (GLUCOPHAGE) 500 MG tablet, , Disp: , Rfl: 3  •  propranolol (INDERAL) 10 MG tablet, Take 1 tablet by mouth 2 (Two) Times a Day., Disp: 60 tablet, Rfl: 3  •  ranolazine (RANEXA) 500 MG 12 hr tablet, Take 1 tablet by mouth 2 (Two) Times a Day., Disp: 60 tablet, Rfl: 5      The following portions of the patient's history were reviewed and updated as appropriate: allergies, current medications, past family history, past medical history, past social history, past surgical history and problem list.    Social History     Tobacco Use   • Smoking status: Current Every Day Smoker     Packs/day: 1.00     Years: 40.00     Pack years: 40.00     Types: Cigarettes   • Smokeless tobacco: Never Used   Substance Use Topics   • Alcohol use: Yes     Frequency: Never   • Drug use: No       Review of Systems   Constitution: Positive for weakness. Negative for malaise/fatigue.   Cardiovascular: Positive for chest pain. Negative for dyspnea on exertion, irregular heartbeat, leg swelling, near-syncope, orthopnea, palpitations, paroxysmal nocturnal dyspnea and syncope.   Respiratory: Negative for cough, shortness of breath and wheezing.    Neurological: Negative for dizziness and light-headedness.       Objective   Vitals:    12/03/19 0821   BP: 168/91   BP Location: Right arm   Pulse: 90   Resp: 16   Weight: 91.6 kg (202 lb)   Height: 170.2 cm (67.01\")     Body mass index is 31.63 kg/m².        Physical Exam   Constitutional: He is oriented to person, place, and time. He appears well-developed and well-nourished.   HENT:   Head: Normocephalic and atraumatic.   Cardiovascular: Normal rate, regular rhythm and normal heart sounds. Exam reveals no S3 and no S4.   No murmur heard.  Pulmonary/Chest: Effort normal and breath sounds normal. He has no wheezes. He has no rales.   Abdominal: Soft. Bowel sounds are normal.   Musculoskeletal: He exhibits no edema.   Neurological: He is alert and " oriented to person, place, and time.   Skin: Skin is warm and dry.   Psychiatric: He has a normal mood and affect. His behavior is normal.       Lab Results   Component Value Date     07/15/2019    K 4.0 07/15/2019    CL 98 07/15/2019    CO2 27.5 07/15/2019    BUN 11 07/15/2019    CREATININE 0.80 07/15/2019    GLUCOSE 144 (H) 07/15/2019    CALCIUM 10.1 07/15/2019    AST 95 (H) 07/15/2019    ALT 39 07/15/2019    ALKPHOS 123 (H) 07/15/2019      Lab Results   Component Value Date    WBC 4.49 07/15/2019    HGB 14.2 07/15/2019    HCT 43.7 07/15/2019    PLT 66 (L) 07/15/2019        Lab Results   Component Value Date    TSH 1.910 07/15/2019    TRIG 114 07/15/2019    HDL 39 (L) 07/15/2019    LDL 80 07/15/2019          Procedures      Assessment/Plan    Diagnosis Plan   1. Essential hypertension with elevated systolic blood pressure.  lisinopril (PRINIVIL,ZESTRIL) 40 MG tablet    Basic Metabolic Panel   2. Abnormal nuclear stress test     3. Type 2 diabetes mellitus without complication, without long-term current use of insulin (CMS/[facility])     4. Precordial pain with some typical and some atypical features for angina pectoris.  ranolazine (RANEXA) 500 MG 12 hr tablet                Recommendations:    1.  I have again discussed with him about cardiac catheterization for his persistent chest pains despite medical management and recently abnormal nuclear stress test.  However, he states he does not want to pursue this.    2.  We will increase lisinopril to 40 mg daily for his uncontrolled hypertension.    3.  Start Ranexa 500 mg twice daily for his chest pains.    4.  Continue low-dose aspirin, propranolol, Imdur, and atorvastatin.    5.  Follow-up in 2 months and if needed.        Return in about 2 months (around 2/3/2020) for Recheck.    As always, I appreciate very much the opportunity to participate in the cardiovascular care of your patients.      With Best Regards,    DEBRA Flores

## 2020-02-06 RX ORDER — PROPRANOLOL HYDROCHLORIDE 10 MG/1
TABLET ORAL
Qty: 60 TABLET | Refills: 1 | Status: SHIPPED | OUTPATIENT
Start: 2020-02-06

## 2020-04-03 ENCOUNTER — HOSPITAL ENCOUNTER (EMERGENCY)
Facility: HOSPITAL | Age: 60
Discharge: ANOTHER HOSPITAL | End: 2020-04-03
Attending: FAMILY MEDICINE | Admitting: FAMILY MEDICINE

## 2020-04-03 ENCOUNTER — APPOINTMENT (OUTPATIENT)
Dept: CT IMAGING | Facility: HOSPITAL | Age: 60
End: 2020-04-03

## 2020-04-03 ENCOUNTER — APPOINTMENT (OUTPATIENT)
Dept: GENERAL RADIOLOGY | Facility: HOSPITAL | Age: 60
End: 2020-04-03

## 2020-04-03 ENCOUNTER — APPOINTMENT (OUTPATIENT)
Dept: ULTRASOUND IMAGING | Facility: HOSPITAL | Age: 60
End: 2020-04-03

## 2020-04-03 VITALS
TEMPERATURE: 98.2 F | SYSTOLIC BLOOD PRESSURE: 94 MMHG | HEIGHT: 67 IN | BODY MASS INDEX: 31.39 KG/M2 | OXYGEN SATURATION: 99 % | DIASTOLIC BLOOD PRESSURE: 52 MMHG | RESPIRATION RATE: 18 BRPM | WEIGHT: 200 LBS | HEART RATE: 87 BPM

## 2020-04-03 DIAGNOSIS — K81.0 ACUTE CHOLECYSTITIS: Primary | ICD-10-CM

## 2020-04-03 DIAGNOSIS — J18.9 PNEUMONIA OF LEFT LOWER LOBE DUE TO INFECTIOUS ORGANISM: ICD-10-CM

## 2020-04-03 DIAGNOSIS — E80.6 HYPERBILIRUBINEMIA: ICD-10-CM

## 2020-04-03 DIAGNOSIS — R11.2 NAUSEA AND VOMITING IN ADULT: ICD-10-CM

## 2020-04-03 DIAGNOSIS — F10.10 ALCOHOL ABUSE: ICD-10-CM

## 2020-04-03 LAB
ALBUMIN SERPL-MCNC: 1.97 G/DL (ref 3.5–5.2)
ALBUMIN/GLOB SERPL: 0.4 G/DL
ALP SERPL-CCNC: 359 U/L (ref 39–117)
ALT SERPL W P-5'-P-CCNC: 106 U/L (ref 1–41)
AMMONIA BLD-SCNC: 60 UMOL/L (ref 16–60)
ANION GAP SERPL CALCULATED.3IONS-SCNC: 16.5 MMOL/L (ref 5–15)
AST SERPL-CCNC: 317 U/L (ref 1–40)
B PERT DNA SPEC QL NAA+PROBE: NOT DETECTED
BACTERIA UR QL AUTO: ABNORMAL /HPF
BILIRUB SERPL-MCNC: 13.4 MG/DL (ref 0.2–1.2)
BILIRUB UR QL STRIP: ABNORMAL
BUN BLD-MCNC: 59 MG/DL (ref 8–23)
BUN/CREAT SERPL: 50 (ref 7–25)
C PNEUM DNA NPH QL NAA+NON-PROBE: NOT DETECTED
CALCIUM SPEC-SCNC: 7.9 MG/DL (ref 8.6–10.5)
CHLORIDE SERPL-SCNC: 85 MMOL/L (ref 98–107)
CLARITY UR: CLEAR
CO2 SERPL-SCNC: 22.5 MMOL/L (ref 22–29)
COLOR UR: ABNORMAL
CREAT BLD-MCNC: 1.18 MG/DL (ref 0.76–1.27)
CRP SERPL-MCNC: 16.29 MG/DL (ref 0–0.5)
D DIMER PPP FEU-MCNC: 3.77 MCGFEU/ML (ref 0–0.5)
D-LACTATE SERPL-SCNC: 2.2 MMOL/L (ref 0.5–2)
D-LACTATE SERPL-SCNC: 4.1 MMOL/L (ref 0.5–2)
DEPRECATED RDW RBC AUTO: 50.7 FL (ref 37–54)
ERYTHROCYTE [DISTWIDTH] IN BLOOD BY AUTOMATED COUNT: 14.6 % (ref 12.3–15.4)
ETHANOL BLD-MCNC: <10 MG/DL (ref 0–10)
ETHANOL UR QL: <0.01 %
FERRITIN SERPL-MCNC: 1678 NG/ML (ref 30–400)
FLUAV AG NPH QL: NEGATIVE
FLUAV H1 2009 PAND RNA NPH QL NAA+PROBE: NOT DETECTED
FLUAV H1 HA GENE NPH QL NAA+PROBE: NOT DETECTED
FLUAV H3 RNA NPH QL NAA+PROBE: NOT DETECTED
FLUAV SUBTYP SPEC NAA+PROBE: NOT DETECTED
FLUBV AG NPH QL IA: NEGATIVE
FLUBV RNA ISLT QL NAA+PROBE: NOT DETECTED
GFR SERPL CREATININE-BSD FRML MDRD: 63 ML/MIN/1.73
GLOBULIN UR ELPH-MCNC: 4.5 GM/DL
GLUCOSE BLD-MCNC: 201 MG/DL (ref 65–99)
GLUCOSE UR STRIP-MCNC: NEGATIVE MG/DL
HADV DNA SPEC NAA+PROBE: NOT DETECTED
HAV IGM SERPL QL IA: NORMAL
HBV CORE IGM SERPL QL IA: NORMAL
HBV SURFACE AG SERPL QL IA: NORMAL
HCOV 229E RNA SPEC QL NAA+PROBE: NOT DETECTED
HCOV HKU1 RNA SPEC QL NAA+PROBE: NOT DETECTED
HCOV NL63 RNA SPEC QL NAA+PROBE: NOT DETECTED
HCOV OC43 RNA SPEC QL NAA+PROBE: NOT DETECTED
HCT VFR BLD AUTO: 37.7 % (ref 37.5–51)
HCV AB SER DONR QL: NORMAL
HGB BLD-MCNC: 13.3 G/DL (ref 13–17.7)
HGB UR QL STRIP.AUTO: NEGATIVE
HMPV RNA NPH QL NAA+NON-PROBE: NOT DETECTED
HPIV1 RNA SPEC QL NAA+PROBE: NOT DETECTED
HPIV2 RNA SPEC QL NAA+PROBE: NOT DETECTED
HPIV3 RNA NPH QL NAA+PROBE: NOT DETECTED
HPIV4 P GENE NPH QL NAA+PROBE: NOT DETECTED
HYALINE CASTS UR QL AUTO: ABNORMAL /LPF
KETONES UR QL STRIP: NEGATIVE
LACTATE HOLD SPECIMEN: NORMAL
LDH SERPL-CCNC: 299 U/L (ref 135–225)
LEUKOCYTE ESTERASE UR QL STRIP.AUTO: NEGATIVE
LYMPHOCYTES # BLD MANUAL: 0.96 10*3/MM3 (ref 0.7–3.1)
LYMPHOCYTES NFR BLD MANUAL: 3 % (ref 19.6–45.3)
LYMPHOCYTES NFR BLD MANUAL: 5 % (ref 5–12)
M PNEUMO IGG SER IA-ACNC: NOT DETECTED
M PNEUMO IGM SER QL: NEGATIVE
MAGNESIUM SERPL-MCNC: 2 MG/DL (ref 1.6–2.4)
MCH RBC QN AUTO: 33.6 PG (ref 26.6–33)
MCHC RBC AUTO-ENTMCNC: 35.3 G/DL (ref 31.5–35.7)
MCV RBC AUTO: 95.2 FL (ref 79–97)
MONOCYTES # BLD AUTO: 1.59 10*3/MM3 (ref 0.1–0.9)
NEUTROPHILS # BLD AUTO: 29.31 10*3/MM3 (ref 1.7–7)
NEUTROPHILS NFR BLD MANUAL: 89 % (ref 42.7–76)
NEUTS BAND NFR BLD MANUAL: 3 % (ref 0–5)
NITRITE UR QL STRIP: POSITIVE
NT-PROBNP SERPL-MCNC: 1327 PG/ML (ref 5–900)
PH UR STRIP.AUTO: 6 [PH] (ref 5–8)
PLATELET # BLD AUTO: 78 10*3/MM3 (ref 140–450)
PMV BLD AUTO: 11.8 FL (ref 6–12)
POTASSIUM BLD-SCNC: 3.6 MMOL/L (ref 3.5–5.2)
PROT SERPL-MCNC: 6.5 G/DL (ref 6–8.5)
PROT UR QL STRIP: NEGATIVE
RBC # BLD AUTO: 3.96 10*6/MM3 (ref 4.14–5.8)
RBC # UR: ABNORMAL /HPF
RBC MORPH BLD: NORMAL
REF LAB TEST METHOD: ABNORMAL
RHINOVIRUS RNA SPEC NAA+PROBE: NOT DETECTED
RSV RNA NPH QL NAA+NON-PROBE: NOT DETECTED
S PYO AG THROAT QL: NEGATIVE
SCAN SLIDE: NORMAL
SMALL PLATELETS BLD QL SMEAR: ABNORMAL
SODIUM BLD-SCNC: 124 MMOL/L (ref 136–145)
SP GR UR STRIP: 1.02 (ref 1–1.03)
SQUAMOUS #/AREA URNS HPF: ABNORMAL /HPF
T4 FREE SERPL-MCNC: 0.95 NG/DL (ref 0.93–1.7)
TROPONIN T SERPL-MCNC: <0.01 NG/ML (ref 0–0.03)
TSH SERPL DL<=0.05 MIU/L-ACNC: 0.81 UIU/ML (ref 0.27–4.2)
UROBILINOGEN UR QL STRIP: ABNORMAL
WBC NRBC COR # BLD: 31.86 10*3/MM3 (ref 3.4–10.8)
WBC UR QL AUTO: ABNORMAL /HPF

## 2020-04-03 PROCEDURE — 71045 X-RAY EXAM CHEST 1 VIEW: CPT

## 2020-04-03 PROCEDURE — 93005 ELECTROCARDIOGRAM TRACING: CPT | Performed by: PHYSICIAN ASSISTANT

## 2020-04-03 PROCEDURE — 96366 THER/PROPH/DIAG IV INF ADDON: CPT

## 2020-04-03 PROCEDURE — 74177 CT ABD & PELVIS W/CONTRAST: CPT | Performed by: RADIOLOGY

## 2020-04-03 PROCEDURE — 71045 X-RAY EXAM CHEST 1 VIEW: CPT | Performed by: RADIOLOGY

## 2020-04-03 PROCEDURE — 71275 CT ANGIOGRAPHY CHEST: CPT | Performed by: RADIOLOGY

## 2020-04-03 PROCEDURE — 87880 STREP A ASSAY W/OPTIC: CPT | Performed by: PHYSICIAN ASSISTANT

## 2020-04-03 PROCEDURE — 0 IOVERSOL 68 % SOLUTION: Performed by: FAMILY MEDICINE

## 2020-04-03 PROCEDURE — 84145 PROCALCITONIN (PCT): CPT | Performed by: PHYSICIAN ASSISTANT

## 2020-04-03 PROCEDURE — 96361 HYDRATE IV INFUSION ADD-ON: CPT

## 2020-04-03 PROCEDURE — 83605 ASSAY OF LACTIC ACID: CPT | Performed by: PHYSICIAN ASSISTANT

## 2020-04-03 PROCEDURE — 96368 THER/DIAG CONCURRENT INF: CPT

## 2020-04-03 PROCEDURE — 82728 ASSAY OF FERRITIN: CPT | Performed by: PHYSICIAN ASSISTANT

## 2020-04-03 PROCEDURE — 84439 ASSAY OF FREE THYROXINE: CPT | Performed by: PHYSICIAN ASSISTANT

## 2020-04-03 PROCEDURE — 80053 COMPREHEN METABOLIC PANEL: CPT | Performed by: PHYSICIAN ASSISTANT

## 2020-04-03 PROCEDURE — 86140 C-REACTIVE PROTEIN: CPT | Performed by: PHYSICIAN ASSISTANT

## 2020-04-03 PROCEDURE — 96365 THER/PROPH/DIAG IV INF INIT: CPT

## 2020-04-03 PROCEDURE — 0099U HC BIOFIRE FILMARRAY RESP PANEL 1: CPT | Performed by: PHYSICIAN ASSISTANT

## 2020-04-03 PROCEDURE — 87804 INFLUENZA ASSAY W/OPTIC: CPT | Performed by: PHYSICIAN ASSISTANT

## 2020-04-03 PROCEDURE — 25010000002 VANCOMYCIN 5 G RECONSTITUTED SOLUTION: Performed by: PHYSICIAN ASSISTANT

## 2020-04-03 PROCEDURE — 74177 CT ABD & PELVIS W/CONTRAST: CPT

## 2020-04-03 PROCEDURE — 76705 ECHO EXAM OF ABDOMEN: CPT

## 2020-04-03 PROCEDURE — 85025 COMPLETE CBC W/AUTO DIFF WBC: CPT | Performed by: PHYSICIAN ASSISTANT

## 2020-04-03 PROCEDURE — 99285 EMERGENCY DEPT VISIT HI MDM: CPT

## 2020-04-03 PROCEDURE — 81001 URINALYSIS AUTO W/SCOPE: CPT | Performed by: PHYSICIAN ASSISTANT

## 2020-04-03 PROCEDURE — 85379 FIBRIN DEGRADATION QUANT: CPT | Performed by: PHYSICIAN ASSISTANT

## 2020-04-03 PROCEDURE — 70450 CT HEAD/BRAIN W/O DYE: CPT | Performed by: RADIOLOGY

## 2020-04-03 PROCEDURE — 83735 ASSAY OF MAGNESIUM: CPT | Performed by: PHYSICIAN ASSISTANT

## 2020-04-03 PROCEDURE — 25010000002 PIPERACILLIN SOD-TAZOBACTAM PER 1 G: Performed by: PHYSICIAN ASSISTANT

## 2020-04-03 PROCEDURE — 93010 ELECTROCARDIOGRAM REPORT: CPT | Performed by: SPECIALIST

## 2020-04-03 PROCEDURE — 82140 ASSAY OF AMMONIA: CPT | Performed by: PHYSICIAN ASSISTANT

## 2020-04-03 PROCEDURE — 87040 BLOOD CULTURE FOR BACTERIA: CPT | Performed by: PHYSICIAN ASSISTANT

## 2020-04-03 PROCEDURE — 87081 CULTURE SCREEN ONLY: CPT | Performed by: PHYSICIAN ASSISTANT

## 2020-04-03 PROCEDURE — 25010000002 AZITHROMYCIN 500 MG/250 ML: Performed by: PHYSICIAN ASSISTANT

## 2020-04-03 PROCEDURE — 84484 ASSAY OF TROPONIN QUANT: CPT | Performed by: PHYSICIAN ASSISTANT

## 2020-04-03 PROCEDURE — 80074 ACUTE HEPATITIS PANEL: CPT | Performed by: PHYSICIAN ASSISTANT

## 2020-04-03 PROCEDURE — 70450 CT HEAD/BRAIN W/O DYE: CPT

## 2020-04-03 PROCEDURE — 83880 ASSAY OF NATRIURETIC PEPTIDE: CPT | Performed by: PHYSICIAN ASSISTANT

## 2020-04-03 PROCEDURE — 71275 CT ANGIOGRAPHY CHEST: CPT

## 2020-04-03 PROCEDURE — 84443 ASSAY THYROID STIM HORMONE: CPT | Performed by: PHYSICIAN ASSISTANT

## 2020-04-03 PROCEDURE — 76705 ECHO EXAM OF ABDOMEN: CPT | Performed by: RADIOLOGY

## 2020-04-03 PROCEDURE — 85007 BL SMEAR W/DIFF WBC COUNT: CPT | Performed by: PHYSICIAN ASSISTANT

## 2020-04-03 PROCEDURE — 86738 MYCOPLASMA ANTIBODY: CPT | Performed by: PHYSICIAN ASSISTANT

## 2020-04-03 PROCEDURE — 80307 DRUG TEST PRSMV CHEM ANLYZR: CPT | Performed by: PHYSICIAN ASSISTANT

## 2020-04-03 PROCEDURE — 83615 LACTATE (LD) (LDH) ENZYME: CPT | Performed by: PHYSICIAN ASSISTANT

## 2020-04-03 PROCEDURE — 96367 TX/PROPH/DG ADDL SEQ IV INF: CPT

## 2020-04-03 RX ADMIN — VANCOMYCIN HYDROCHLORIDE 1750 MG: 5 INJECTION, POWDER, LYOPHILIZED, FOR SOLUTION INTRAVENOUS at 15:16

## 2020-04-03 RX ADMIN — SODIUM CHLORIDE 2721 ML: 9 INJECTION, SOLUTION INTRAVENOUS at 13:52

## 2020-04-03 RX ADMIN — SODIUM CHLORIDE 1000 ML: 9 INJECTION, SOLUTION INTRAVENOUS at 12:40

## 2020-04-03 RX ADMIN — AZITHROMYCIN MONOHYDRATE 500 MG: 500 INJECTION, POWDER, LYOPHILIZED, FOR SOLUTION INTRAVENOUS at 13:56

## 2020-04-03 RX ADMIN — IOVERSOL 100 ML: 678 INJECTION INTRA-ARTERIAL; INTRAVENOUS at 13:41

## 2020-04-03 RX ADMIN — PIPERACILLIN AND TAZOBACTAM 3.38 G: 3; .375 INJECTION, POWDER, FOR SOLUTION INTRAVENOUS at 14:41

## 2020-04-03 NOTE — ED NOTES
transfer center called back, pt is going to [facility] to room 225 on the 9th floor.     Kami Wright  04/03/20 8423

## 2020-04-03 NOTE — ED NOTES
Patient requesting food at this time. Provider aware and patient informed that he is NPO due to complaints of N/V for 2 weeks.     Tiago Mercado RN  04/03/20 0465

## 2020-04-03 NOTE — ED NOTES
Family updated about pt and current care plan when they called     Rufus Barrera RN  04/03/20 6926

## 2020-04-03 NOTE — ED NOTES
Attempted to scan azithromycin and it would not scan called pharmacy and alerted them to situation. Pharmacy verified medication and advised to proceed and administer it.      Tiago Mercado, RN  04/03/20 3460

## 2020-04-03 NOTE — ED NOTES
Called [facility] for ALS transfer to [facility], spoke with Lorna, stated she would contact [facility] and call me back.     Kami Wright  04/03/20 3588

## 2020-04-03 NOTE — ED NOTES
Faxed facesheet to [facility].     [facility]  04/03/20 6068

## 2020-04-03 NOTE — ED PROVIDER NOTES
"Subjective   This is a 59-year-old male that comes in with chief complaint \"generalized fatigue, weakness, cough\" x3 weeks.  Patient states he has had productive cough with scant sputum.  Also reports sore throat, dizziness. Patient also states that he has had abdominal pain, nausea and vomiting worsening over past week. States that he has had upper abdominal pain described as cramping pain. Has had several episodes of non-bilious non bloody emesis. Patient does have history of diabetes, hypertension. Patient denies any fever, chest pain.  Does state he smokes approximately 2 packs cigarettes per day. Patient does state that he drinks daily varying from 1-10 beers a day. Patient denies drinking over past week.       History provided by:  Patient   used: No    Shortness of Breath   Severity:  Moderate  Onset quality:  Gradual  Duration:  3 weeks  Timing:  Intermittent  Progression:  Worsening  Chronicity:  New  Context: not activity, not animal exposure, not emotional upset, not known allergens, not occupational exposure, not pollens, not strong odors, not URI and not weather changes    Relieved by:  Nothing  Worsened by:  Nothing  Ineffective treatments:  None tried  Associated symptoms: cough and sore throat    Associated symptoms: no abdominal pain, no chest pain, no ear pain, no fever, no headaches, no hemoptysis and no neck pain    Risk factors: tobacco use    Risk factors: no recent alcohol use, no family hx of DVT, no hx of PE/DVT, no obesity, no prolonged immobilization and no recent surgery    Abdominal Pain   Pain location:  RUQ and LUQ  Pain quality: gnawing, sharp and stabbing    Pain radiates to:  Does not radiate  Pain severity:  Moderate  Onset quality:  Gradual  Duration:  2 weeks  Timing:  Intermittent  Progression:  Worsening  Chronicity:  New  Context: not alcohol use, not awakening from sleep, not eating, not medication withdrawal, not previous surgeries, not recent illness, " not recent sexual activity, not retching, not sick contacts and not trauma    Relieved by:  Nothing  Worsened by:  Nothing  Ineffective treatments:  None tried  Associated symptoms: chills, cough, fatigue, shortness of breath and sore throat    Associated symptoms: no chest pain, no dysuria and no fever    Risk factors: alcohol abuse    Risk factors: has not had multiple surgeries, no NSAID use, not pregnant and no recent hospitalization        Review of Systems   Constitutional: Positive for chills and fatigue. Negative for fever.   HENT: Positive for congestion and sore throat. Negative for ear discharge, ear pain, facial swelling and hearing loss.    Eyes: Negative.  Negative for pain.   Respiratory: Positive for cough and shortness of breath. Negative for hemoptysis and chest tightness.    Cardiovascular: Negative.  Negative for chest pain and palpitations.   Gastrointestinal: Negative for abdominal pain.   Endocrine: Negative.  Negative for cold intolerance, heat intolerance and polydipsia.   Genitourinary: Negative.  Negative for difficulty urinating, dysuria, enuresis, flank pain and genital sores.   Musculoskeletal: Negative.  Negative for arthralgias, back pain, gait problem, joint swelling and neck pain.   Allergic/Immunologic: Negative for environmental allergies and food allergies.   Neurological: Negative for headaches.   All other systems reviewed and are negative.      Past Medical History:   Diagnosis Date   • Diabetes mellitus (CMS/HCC)    • Heart murmur    • Hypertension        Allergies   Allergen Reactions   • Morphine Other (See Comments)     PATIENT STATES FLATLINED       No past surgical history on file.    Family History   Problem Relation Age of Onset   • Alzheimer's disease Mother    • Heart disease Father    • Diabetes Father    • Stroke Father        Social History     Socioeconomic History   • Marital status:      Spouse name: Not on file   • Number of children: Not on file   •  Years of education: Not on file   • Highest education level: Not on file   Tobacco Use   • Smoking status: Current Every Day Smoker     Packs/day: 1.00     Years: 40.00     Pack years: 40.00     Types: Cigarettes   • Smokeless tobacco: Never Used   Substance and Sexual Activity   • Alcohol use: Yes     Frequency: Never   • Drug use: No   • Sexual activity: Defer           Objective   Physical Exam   Constitutional: He is oriented to person, place, and time. He appears well-developed and well-nourished.  Non-toxic appearance. He does not appear ill. No distress. He is not intubated.   HENT:   Head: Normocephalic and atraumatic.   Mouth/Throat: Oropharynx is clear and moist. No oropharyngeal exudate or posterior oropharyngeal edema.   Eyes: Pupils are equal, round, and reactive to light. EOM are normal. Scleral icterus is present.   Neck: Normal range of motion. Neck supple. No hepatojugular reflux and no JVD present. No tracheal deviation present. No thyromegaly present.   Cardiovascular: Normal rate, regular rhythm, normal heart sounds and intact distal pulses.  No extrasystoles are present. Exam reveals no gallop and no friction rub.   No murmur heard.  Pulmonary/Chest: Effort normal. No accessory muscle usage. No apnea, no tachypnea and no bradypnea. He is not intubated. No respiratory distress. He has decreased breath sounds in the right middle field, the right lower field, the left middle field and the left lower field. He has no rhonchi. He exhibits no mass, no tenderness, no laceration, no crepitus, no edema, no deformity, no swelling and no retraction.   Abdominal: Soft. Bowel sounds are normal. He exhibits distension. He exhibits no ascites and no mass. There is no splenomegaly or hepatomegaly. There is tenderness. There is no rebound and no guarding.   Musculoskeletal: Normal range of motion.        Right lower leg: Normal. He exhibits no tenderness and no edema.        Left lower leg: Normal. He exhibits  no tenderness and no edema.   Lymphadenopathy:     He has no cervical adenopathy.   Neurological: He is alert and oriented to person, place, and time. He is not disoriented. No cranial nerve deficit.   Skin: Skin is warm. No ecchymosis and no rash noted. He is not diaphoretic. No erythema. There is pallor. Nails show no clubbing.   Psychiatric: His behavior is normal. His mood appears not anxious.   Nursing note and vitals reviewed.      Procedures           ED Course  ED Course as of Apr 03 1605 Fri Apr 03, 2020   1256 No acute intracranial pathology. Nothing is seen on this exam to  specifically account for the patient's symptoms.       [BH]   1256 IMPRESSION:  No acute intracranial pathology. Nothing is seen on this exam to  specifically account for the patient's symptoms.    [BH]   1317 Hazy appearance in the left lung with increased interstitial markings,  very possibly a viral type pneumonia    [BH]   1359 Impression: Gallbladder wall thickening and pericholecystic fluid. No  cholelithiasis    [BH]   1401 1. Dense consolidation in the left lung base concerning for pneumonia  and very possibly neoplastic process  2. Small amount of free fluid in the abdomen and pelvis.  3. Gallbladder wall thickening and pericholecystic fluid  4. Thickening of the gastric wall, sigmoid colon wall, and rectal wall.  I would suggest direct visualization.  5. Evidence of atherosclerotic vascular disease  6. Arthritic change in the spine. Remote compression fracture of L1    [BH]   1401 1. No evidence of a pulmonary embolus. Somewhat limited bolus however.  2. Dense consolidation in the left lower lobe, probably pneumonia. Also  small left effusion  3. Small amount of free fluid in the abdomen and there is  pericholecystic fluid.    [BH]   1408 Discussed care with Dr. Miranda. Patient will be consulted on for his acute cholecystitis.     [BH]   1418 Call placed to Hospitalist for possible admit.     [BH]   1424 Discussed care with  dr. Hayes.  Feels the patient would benefit from a percutaneous drain for acute cholecystitis. Recommended transfer to [facility].     [BH]   1450 Call placed to [facility]. No available ICU beds. Will try .      [BH]   1500 Dr. Burris at  recommended calling Internal medicine.     [BH]   1517 I have personally seen and evaluated this patient and agree with Regan's plan, work-up and documentation.  Patient appears jaundiced.  Breath sounds are clear bilaterally.    [EG]   1527 Discussed care with Dr. Chaparro. Wanted me to talk interventional radiology.     [BH]   1555 Discussed care with Interventional radiology at . States that he cannot comment on whether percutaneous tube is needed for gall bladder.     [BH]      ED Course User Index  [BH] Freddy Mccrary PA-C  [EG] Felicity Gallego, DO                                           MDM  Number of Diagnoses or Management Options  Acute cholecystitis: new and requires workup  Alcohol abuse: new and requires workup  Hyperbilirubinemia: new and requires workup  Nausea and vomiting in adult: new and requires workup  Pneumonia of left lower lobe due to infectious organism (CMS/HCC): new and requires workup     Amount and/or Complexity of Data Reviewed  Clinical lab tests: reviewed and ordered  Tests in the radiology section of CPT®: ordered and reviewed  Tests in the medicine section of CPT®: reviewed  Decide to obtain previous medical records or to obtain history from someone other than the patient: yes    Risk of Complications, Morbidity, and/or Mortality  Presenting problems: high  Diagnostic procedures: high  Management options: high    Critical Care  Total time providing critical care:  minutes    Patient Progress  Patient progress: stable      Final diagnoses:   Acute cholecystitis   Hyperbilirubinemia   Pneumonia of left lower lobe due to infectious organism (CMS/HCC)   Nausea and vomiting in adult   Alcohol abuse             Freddy Mccrary PA-C  04/03/20 8283       Freddy Mccrary PA-C  04/03/20 1604

## 2020-04-03 NOTE — ED NOTES
Kamala Mejia, pt's sister, left number for when pt is transferred [phone]. Primary nurse made aware.     Kami Wright  04/03/20 8793

## 2020-04-03 NOTE — ED NOTES
[facility] declined ALS due to not having enough ALS trucks, but primary RN notified me that pt's antibiotics were done and could go BLS. Spoke with Lorna again and stated she had to call it out to [facility] and she would call me back.     Kami Wright  04/03/20 3599

## 2020-04-03 NOTE — ED NOTES
Called [facility] about possible pt transfer, [facility] stated they did not have any ICU beds. Provider made aware.     Kami Wright  04/03/20 2915

## 2020-04-03 NOTE — ED NOTES
Called [facility] for possible pt transfer, Dr. Burris on the phone with AI MAURO at this time.     [facility]  04/03/20 0282

## 2020-04-03 NOTE — ED NOTES
[facility] accepted BLS transfer, stated it would be about 30 minutes or longer due to being right at shift change.     Kami Wright  04/03/20 2086

## 2020-04-03 NOTE — ED NOTES
Called [facility] about an update on pt's bed, bed coordinator stated he had a bed assigned but it wouldn't be ready until this evening. Provider and primary RN made aware.     Kami Wright  04/03/20 2273

## 2020-04-04 LAB
HOLD SPECIMEN: NORMAL
PROCALCITONIN SERPL-MCNC: 6.45 NG/ML (ref 0.1–0.25)

## 2020-04-05 LAB — BACTERIA SPEC AEROBE CULT: NORMAL

## 2020-04-08 LAB
BACTERIA SPEC AEROBE CULT: NORMAL
BACTERIA SPEC AEROBE CULT: NORMAL

## 2020-04-19 ENCOUNTER — APPOINTMENT (OUTPATIENT)
Dept: GENERAL RADIOLOGY | Facility: HOSPITAL | Age: 60
End: 2020-04-19

## 2020-04-19 ENCOUNTER — HOSPITAL ENCOUNTER (EMERGENCY)
Facility: HOSPITAL | Age: 60
Discharge: LEFT BEFORE DISCHARGE | End: 2020-04-19
Attending: EMERGENCY MEDICINE | Admitting: EMERGENCY MEDICINE

## 2020-04-19 VITALS
DIASTOLIC BLOOD PRESSURE: 62 MMHG | OXYGEN SATURATION: 98 % | BODY MASS INDEX: 31.39 KG/M2 | SYSTOLIC BLOOD PRESSURE: 107 MMHG | HEART RATE: 64 BPM | RESPIRATION RATE: 20 BRPM | TEMPERATURE: 98.3 F | WEIGHT: 200 LBS | HEIGHT: 67 IN

## 2020-04-19 DIAGNOSIS — K70.30 ALCOHOLIC CIRRHOSIS, UNSPECIFIED WHETHER ASCITES PRESENT (HCC): Primary | ICD-10-CM

## 2020-04-19 DIAGNOSIS — R17 JAUNDICE: ICD-10-CM

## 2020-04-19 DIAGNOSIS — J18.9 PNEUMONIA OF LEFT LOWER LOBE DUE TO INFECTIOUS ORGANISM: ICD-10-CM

## 2020-04-19 LAB
A-A DO2: 27.5 MMHG (ref 0–300)
ALBUMIN SERPL-MCNC: 2.25 G/DL (ref 3.5–5.2)
ALBUMIN/GLOB SERPL: 0.6 G/DL
ALP SERPL-CCNC: 108 U/L (ref 39–117)
ALT SERPL W P-5'-P-CCNC: 59 U/L (ref 1–41)
AMYLASE SERPL-CCNC: 71 U/L (ref 28–100)
ANION GAP SERPL CALCULATED.3IONS-SCNC: 19.5 MMOL/L (ref 5–15)
ANISOCYTOSIS BLD QL: NORMAL
APTT PPP: 69.6 SECONDS (ref 23.8–36.1)
ARTERIAL PATENCY WRIST A: POSITIVE
AST SERPL-CCNC: 163 U/L (ref 1–40)
ATMOSPHERIC PRESS: 720 MMHG
BASE EXCESS BLDA CALC-SCNC: -15.2 MMOL/L (ref 0–2)
BASOPHILS # BLD AUTO: 0.03 10*3/MM3 (ref 0–0.2)
BASOPHILS NFR BLD AUTO: 0.5 % (ref 0–1.5)
BDY SITE: ABNORMAL
BILIRUB SERPL-MCNC: 29 MG/DL (ref 0.2–1.2)
BODY TEMPERATURE: 0 C
BUN BLD-MCNC: 100 MG/DL (ref 8–23)
BUN/CREAT SERPL: 18.3 (ref 7–25)
BURR CELLS BLD QL SMEAR: NORMAL
CALCIUM SPEC-SCNC: 8.7 MG/DL (ref 8.6–10.5)
CHLORIDE SERPL-SCNC: 103 MMOL/L (ref 98–107)
CO2 BLDA-SCNC: 10.7 MMOL/L (ref 22–33)
CO2 SERPL-SCNC: 9.5 MMOL/L (ref 22–29)
COHGB MFR BLD: 1.7 % (ref 0–5)
CREAT BLD-MCNC: 5.47 MG/DL (ref 0.76–1.27)
CRP SERPL-MCNC: 3.75 MG/DL (ref 0–0.5)
D-LACTATE SERPL-SCNC: 2.8 MMOL/L (ref 0.5–2)
DEPRECATED RDW RBC AUTO: 65.6 FL (ref 37–54)
EOSINOPHIL # BLD AUTO: 0.07 10*3/MM3 (ref 0–0.4)
EOSINOPHIL NFR BLD AUTO: 1.2 % (ref 0.3–6.2)
ERYTHROCYTE [DISTWIDTH] IN BLOOD BY AUTOMATED COUNT: 18.3 % (ref 12.3–15.4)
ETHANOL BLD-MCNC: <10 MG/DL (ref 0–10)
ETHANOL UR QL: <0.01 %
FLUAV AG NPH QL: NEGATIVE
FLUBV AG NPH QL IA: NEGATIVE
GFR SERPL CREATININE-BSD FRML MDRD: 11 ML/MIN/1.73
GFR SERPL CREATININE-BSD FRML MDRD: ABNORMAL ML/MIN/{1.73_M2}
GLOBULIN UR ELPH-MCNC: 3.9 GM/DL
GLUCOSE BLD-MCNC: 87 MG/DL (ref 65–99)
HCO3 BLDA-SCNC: 10 MMOL/L (ref 20–26)
HCT VFR BLD AUTO: 24.8 % (ref 37.5–51)
HCT VFR BLD CALC: 25.2 % (ref 38–51)
HGB BLD-MCNC: 8.5 G/DL (ref 13–17.7)
HGB BLDA-MCNC: 8.2 G/DL (ref 14–18)
HOLD SPECIMEN: NORMAL
HOLD SPECIMEN: NORMAL
HOROWITZ INDEX BLD+IHG-RTO: 21 %
IMM GRANULOCYTES # BLD AUTO: 0.02 10*3/MM3 (ref 0–0.05)
IMM GRANULOCYTES NFR BLD AUTO: 0.3 % (ref 0–0.5)
INR PPP: 2.89 (ref 0.9–1.1)
LACTATE HOLD SPECIMEN: NORMAL
LIPASE SERPL-CCNC: 95 U/L (ref 13–60)
LYMPHOCYTES # BLD AUTO: 1.15 10*3/MM3 (ref 0.7–3.1)
LYMPHOCYTES NFR BLD AUTO: 19.7 % (ref 19.6–45.3)
Lab: ABNORMAL
MACROCYTES BLD QL SMEAR: NORMAL
MAGNESIUM SERPL-MCNC: 1.9 MG/DL (ref 1.6–2.4)
MCH RBC QN AUTO: 33.6 PG (ref 26.6–33)
MCHC RBC AUTO-ENTMCNC: 34.3 G/DL (ref 31.5–35.7)
MCV RBC AUTO: 98 FL (ref 79–97)
METHGB BLD QL: <-0.1 % (ref 0–3)
MODALITY: ABNORMAL
MONOCYTES # BLD AUTO: 0.65 10*3/MM3 (ref 0.1–0.9)
MONOCYTES NFR BLD AUTO: 11.1 % (ref 5–12)
NEUTROPHILS # BLD AUTO: 3.91 10*3/MM3 (ref 1.7–7)
NEUTROPHILS NFR BLD AUTO: 67.2 % (ref 42.7–76)
NOTE: ABNORMAL
NRBC BLD AUTO-RTO: 0 /100 WBC (ref 0–0.2)
NT-PROBNP SERPL-MCNC: 1639 PG/ML (ref 5–900)
OXYHGB MFR BLDV: 94.5 % (ref 94–99)
PCO2 BLDA: 21.5 MM HG (ref 35–45)
PCO2 TEMP ADJ BLD: ABNORMAL MM[HG]
PH BLDA: 7.28 PH UNITS (ref 7.35–7.45)
PH, TEMP CORRECTED: ABNORMAL
PLATELET # BLD AUTO: 46 10*3/MM3 (ref 140–450)
PMV BLD AUTO: 14.4 FL (ref 6–12)
PO2 BLDA: 89.6 MM HG (ref 83–108)
PO2 TEMP ADJ BLD: ABNORMAL MM[HG]
POTASSIUM BLD-SCNC: 5.2 MMOL/L (ref 3.5–5.2)
PROT SERPL-MCNC: 6.1 G/DL (ref 6–8.5)
PROTHROMBIN TIME: 31.6 SECONDS (ref 11–15.4)
RBC # BLD AUTO: 2.53 10*6/MM3 (ref 4.14–5.8)
S PYO AG THROAT QL: NEGATIVE
SAO2 % BLDCOA: 95.9 % (ref 94–99)
SCHISTOCYTES BLD QL SMEAR: NORMAL
SMALL PLATELETS BLD QL SMEAR: NORMAL
SODIUM BLD-SCNC: 132 MMOL/L (ref 136–145)
TROPONIN T SERPL-MCNC: 0.04 NG/ML (ref 0–0.03)
VENTILATOR MODE: ABNORMAL
WBC NRBC COR # BLD: 5.83 10*3/MM3 (ref 3.4–10.8)
WHOLE BLOOD HOLD SPECIMEN: NORMAL
WHOLE BLOOD HOLD SPECIMEN: NORMAL

## 2020-04-19 PROCEDURE — 83880 ASSAY OF NATRIURETIC PEPTIDE: CPT | Performed by: PHYSICIAN ASSISTANT

## 2020-04-19 PROCEDURE — 83605 ASSAY OF LACTIC ACID: CPT | Performed by: PHYSICIAN ASSISTANT

## 2020-04-19 PROCEDURE — 80307 DRUG TEST PRSMV CHEM ANLYZR: CPT | Performed by: PHYSICIAN ASSISTANT

## 2020-04-19 PROCEDURE — 82150 ASSAY OF AMYLASE: CPT | Performed by: PHYSICIAN ASSISTANT

## 2020-04-19 PROCEDURE — 85025 COMPLETE CBC W/AUTO DIFF WBC: CPT | Performed by: PHYSICIAN ASSISTANT

## 2020-04-19 PROCEDURE — 87040 BLOOD CULTURE FOR BACTERIA: CPT | Performed by: PHYSICIAN ASSISTANT

## 2020-04-19 PROCEDURE — 82805 BLOOD GASES W/O2 SATURATION: CPT

## 2020-04-19 PROCEDURE — 87081 CULTURE SCREEN ONLY: CPT | Performed by: PHYSICIAN ASSISTANT

## 2020-04-19 PROCEDURE — 85610 PROTHROMBIN TIME: CPT | Performed by: PHYSICIAN ASSISTANT

## 2020-04-19 PROCEDURE — 83735 ASSAY OF MAGNESIUM: CPT | Performed by: PHYSICIAN ASSISTANT

## 2020-04-19 PROCEDURE — 85730 THROMBOPLASTIN TIME PARTIAL: CPT | Performed by: PHYSICIAN ASSISTANT

## 2020-04-19 PROCEDURE — 87804 INFLUENZA ASSAY W/OPTIC: CPT | Performed by: PHYSICIAN ASSISTANT

## 2020-04-19 PROCEDURE — 84484 ASSAY OF TROPONIN QUANT: CPT | Performed by: PHYSICIAN ASSISTANT

## 2020-04-19 PROCEDURE — 71045 X-RAY EXAM CHEST 1 VIEW: CPT

## 2020-04-19 PROCEDURE — 82375 ASSAY CARBOXYHB QUANT: CPT

## 2020-04-19 PROCEDURE — 83050 HGB METHEMOGLOBIN QUAN: CPT

## 2020-04-19 PROCEDURE — 80053 COMPREHEN METABOLIC PANEL: CPT | Performed by: PHYSICIAN ASSISTANT

## 2020-04-19 PROCEDURE — 86140 C-REACTIVE PROTEIN: CPT | Performed by: PHYSICIAN ASSISTANT

## 2020-04-19 PROCEDURE — 99284 EMERGENCY DEPT VISIT MOD MDM: CPT

## 2020-04-19 PROCEDURE — 36600 WITHDRAWAL OF ARTERIAL BLOOD: CPT

## 2020-04-19 PROCEDURE — 87880 STREP A ASSAY W/OPTIC: CPT | Performed by: PHYSICIAN ASSISTANT

## 2020-04-19 PROCEDURE — 99283 EMERGENCY DEPT VISIT LOW MDM: CPT

## 2020-04-19 PROCEDURE — 85007 BL SMEAR W/DIFF WBC COUNT: CPT | Performed by: PHYSICIAN ASSISTANT

## 2020-04-19 PROCEDURE — 83690 ASSAY OF LIPASE: CPT | Performed by: PHYSICIAN ASSISTANT

## 2020-04-19 RX ORDER — SODIUM CHLORIDE 0.9 % (FLUSH) 0.9 %
10 SYRINGE (ML) INJECTION AS NEEDED
Status: DISCONTINUED | OUTPATIENT
Start: 2020-04-19 | End: 2020-04-19 | Stop reason: HOSPADM

## 2020-04-19 NOTE — ED PROVIDER NOTES
Subjective   58 y/o male patient presents to the ED by EMS for liver failure and SOB. Patient has cirrhosis due to alcoholism. Patient was admitted and had been at [facility] but recently left [facility]. He was admitted on 4/3/20 and left on 4/11/20.  Pt daughter called EMS due to pt complaining SOB, per EMS report.  Patient states that his daughter called EMS and he does not want to be here.  He states that he was being tested for coronavirus but does not have results back. EMS reports O2 sat of 89% at home and they placed pt on 6L NC.  Patient is vague and does not want to give anymore information.       History provided by:  Patient   used: No        Review of Systems   Constitutional: Negative.    HENT: Negative.    Eyes: Negative.    Respiratory: Positive for shortness of breath.    Cardiovascular: Negative.    Gastrointestinal: Negative.    Endocrine: Negative.    Genitourinary: Negative.    Musculoskeletal: Negative.    Skin: Positive for color change.   Allergic/Immunologic: Negative.    Neurological: Negative.    Hematological: Negative.    Psychiatric/Behavioral: Negative.    All other systems reviewed and are negative.      Past Medical History:   Diagnosis Date   • Diabetes mellitus (CMS/HCC)    • Heart murmur    • Hypertension        Allergies   Allergen Reactions   • Morphine Other (See Comments)     PATIENT STATES FLATLINED       No past surgical history on file.    Family History   Problem Relation Age of Onset   • Alzheimer's disease Mother    • Heart disease Father    • Diabetes Father    • Stroke Father        Social History     Socioeconomic History   • Marital status:      Spouse name: Not on file   • Number of children: Not on file   • Years of education: Not on file   • Highest education level: Not on file   Tobacco Use   • Smoking status: Current Every Day Smoker     Packs/day: 1.00     Years: 40.00     Pack years: 40.00     Types: Cigarettes   • Smokeless tobacco:  Never Used   Substance and Sexual Activity   • Alcohol use: Yes     Frequency: Never   • Drug use: No   • Sexual activity: Defer           Objective   Physical Exam   Constitutional: He is oriented to person, place, and time. He appears well-developed and well-nourished.   HENT:   Head: Normocephalic and atraumatic.   Mouth/Throat: Oropharynx is clear and moist.   Eyes: Pupils are equal, round, and reactive to light. EOM are normal.   Scleral icterus    Neck: Normal range of motion. Neck supple.   Cardiovascular: Normal rate, regular rhythm, normal heart sounds and intact distal pulses.   Pulmonary/Chest: Effort normal and breath sounds normal.   Abdominal: Soft. Bowel sounds are normal.   Musculoskeletal: Normal range of motion.        Right lower leg: Normal.        Left lower leg: Normal.   Neurological: He is alert and oriented to person, place, and time.   Skin: Skin is warm and dry. Capillary refill takes less than 2 seconds.   Jaundice    Psychiatric: He has a normal mood and affect. His behavior is normal.   Nursing note and vitals reviewed.      Procedures           ED Course  ED Course as of Apr 19 1737   Sun Apr 19, 2020   1716 IMPRESSION:  Small to moderate left pleural effusion with left basilar opacity, concerning for pneumonia in the appropriate clinical setting.     Signer Name: Austen Cardenas MD   Signed: 4/19/2020 5:09 PM   Workstation Name: [email or web address]    [facility]   XR Chest 1 View [ML]   1724 Pt removed all leads, NC, and requesting DC home. I explained to him that this is against my medical advice and would recommend him stay to be transferred to . He requests to leave and refuses care. I spoke with patient's daughter, Iwona [phone]. She will come to get the pt. He is A&OX3.  He is coherent to make his own medical decisions.     [ML]      ED Course User Index  [ML] Keisha Hayes PA                                           MDM  Number of Diagnoses or  Management Options  Alcoholic cirrhosis, unspecified whether ascites present (CMS/HCC):   Jaundice:   Pneumonia of left lower lobe due to infectious organism (CMS/HCC):      Amount and/or Complexity of Data Reviewed  Clinical lab tests: ordered and reviewed  Tests in the radiology section of CPT®: ordered and reviewed  Discuss the patient with other providers: yes (Dr. Murillo seen the patient )    Risk of Complications, Morbidity, and/or Mortality  General comments: PT signed out AMA    Patient Progress  Patient progress: stable      Final diagnoses:   Alcoholic cirrhosis, unspecified whether ascites present (CMS/HCC)   Jaundice   Pneumonia of left lower lobe due to infectious organism (CMS/HCC)            Keisha Hayes PA  04/19/20 1729

## 2020-04-20 ENCOUNTER — APPOINTMENT (OUTPATIENT)
Dept: GENERAL RADIOLOGY | Facility: HOSPITAL | Age: 60
End: 2020-04-20

## 2020-04-20 ENCOUNTER — HOSPITAL ENCOUNTER (EMERGENCY)
Facility: HOSPITAL | Age: 60
Discharge: ANOTHER HOSPITAL | End: 2020-04-20
Attending: EMERGENCY MEDICINE | Admitting: EMERGENCY MEDICINE

## 2020-04-20 ENCOUNTER — APPOINTMENT (OUTPATIENT)
Dept: CT IMAGING | Facility: HOSPITAL | Age: 60
End: 2020-04-20

## 2020-04-20 VITALS
SYSTOLIC BLOOD PRESSURE: 106 MMHG | BODY MASS INDEX: 34.53 KG/M2 | OXYGEN SATURATION: 100 % | DIASTOLIC BLOOD PRESSURE: 56 MMHG | TEMPERATURE: 96.2 F | HEIGHT: 67 IN | WEIGHT: 220 LBS | HEART RATE: 58 BPM | RESPIRATION RATE: 20 BRPM

## 2020-04-20 DIAGNOSIS — N18.9 ACUTE RENAL FAILURE SUPERIMPOSED ON CHRONIC KIDNEY DISEASE, UNSPECIFIED CKD STAGE, UNSPECIFIED ACUTE RENAL FAILURE TYPE (HCC): ICD-10-CM

## 2020-04-20 DIAGNOSIS — J18.9 PNEUMONIA OF LEFT LOWER LOBE DUE TO INFECTIOUS ORGANISM: ICD-10-CM

## 2020-04-20 DIAGNOSIS — I46.9 CARDIAC ARREST (HCC): Primary | ICD-10-CM

## 2020-04-20 DIAGNOSIS — K72.91 LIVER FAILURE WITH HEPATIC COMA, UNSPECIFIED CHRONICITY (HCC): ICD-10-CM

## 2020-04-20 DIAGNOSIS — K92.2 GASTROINTESTINAL HEMORRHAGE, UNSPECIFIED GASTROINTESTINAL HEMORRHAGE TYPE: ICD-10-CM

## 2020-04-20 DIAGNOSIS — N17.9 ACUTE RENAL FAILURE SUPERIMPOSED ON CHRONIC KIDNEY DISEASE, UNSPECIFIED CKD STAGE, UNSPECIFIED ACUTE RENAL FAILURE TYPE (HCC): ICD-10-CM

## 2020-04-20 LAB
6-ACETYL MORPHINE: NEGATIVE
A-A DO2: 277.8 MMHG (ref 0–300)
A-A DO2: 303.5 MMHG (ref 0–300)
ABO GROUP BLD: NORMAL
ALBUMIN SERPL-MCNC: 1.96 G/DL (ref 3.5–5.2)
ALP SERPL-CCNC: 129 U/L (ref 39–117)
ALT SERPL W P-5'-P-CCNC: 75 U/L (ref 1–41)
AMMONIA BLD-SCNC: 523 UMOL/L (ref 16–60)
AMPHET+METHAMPHET UR QL: NEGATIVE
ANION GAP SERPL CALCULATED.3IONS-SCNC: 26 MMOL/L (ref 5–15)
ANISOCYTOSIS BLD QL: ABNORMAL
APAP SERPL-MCNC: <5 MCG/ML (ref 10–30)
APTT PPP: >100 SECONDS (ref 23.8–36.1)
ARTERIAL PATENCY WRIST A: POSITIVE
ARTERIAL PATENCY WRIST A: POSITIVE
AST SERPL-CCNC: 198 U/L (ref 1–40)
ATMOSPHERIC PRESS: 718 MMHG
ATMOSPHERIC PRESS: 718 MMHG
B PERT DNA SPEC QL NAA+PROBE: NOT DETECTED
BACTERIA UR QL AUTO: ABNORMAL /HPF
BARBITURATES UR QL SCN: NEGATIVE
BASE EXCESS BLDA CALC-SCNC: -23.7 MMOL/L (ref 0–2)
BASE EXCESS BLDA CALC-SCNC: -26.5 MMOL/L (ref 0–2)
BDY SITE: ABNORMAL
BDY SITE: ABNORMAL
BENZODIAZ UR QL SCN: NEGATIVE
BILIRUB CONJ SERPL-MCNC: >10 MG/DL (ref 0.2–0.3)
BILIRUB INDIRECT SERPL-MCNC: ABNORMAL MG/DL
BILIRUB SERPL-MCNC: 26.3 MG/DL (ref 0.2–1.2)
BILIRUB UR QL STRIP: ABNORMAL
BLD GP AB SCN SERPL QL: NEGATIVE
BODY TEMPERATURE: 0 C
BODY TEMPERATURE: 0 C
BUN BLD-MCNC: 98 MG/DL (ref 8–23)
BUN/CREAT SERPL: 17.3 (ref 7–25)
BUPRENORPHINE SERPL-MCNC: NEGATIVE NG/ML
BURR CELLS BLD QL SMEAR: ABNORMAL
C PNEUM DNA NPH QL NAA+NON-PROBE: NOT DETECTED
CALCIUM SPEC-SCNC: 8.4 MG/DL (ref 8.6–10.5)
CANNABINOIDS SERPL QL: NEGATIVE
CHLORIDE SERPL-SCNC: 105 MMOL/L (ref 98–107)
CLARITY UR: ABNORMAL
CO2 BLDA-SCNC: 6.8 MMOL/L (ref 22–33)
CO2 BLDA-SCNC: 7.2 MMOL/L (ref 22–33)
CO2 SERPL-SCNC: 7 MMOL/L (ref 22–29)
COCAINE UR QL: NEGATIVE
COHGB MFR BLD: 0.6 % (ref 0–5)
COHGB MFR BLD: 0.7 % (ref 0–5)
COLOR UR: ABNORMAL
CREAT BLD-MCNC: 5.67 MG/DL (ref 0.76–1.27)
CRP SERPL-MCNC: 3.21 MG/DL (ref 0–0.5)
D-LACTATE SERPL-SCNC: 10.4 MMOL/L (ref 0.5–2)
DEPRECATED RDW RBC AUTO: 73.4 FL (ref 37–54)
EOSINOPHIL # BLD MANUAL: 0.06 10*3/MM3 (ref 0–0.4)
EOSINOPHIL NFR BLD MANUAL: 1 % (ref 0.3–6.2)
ERYTHROCYTE [DISTWIDTH] IN BLOOD BY AUTOMATED COUNT: 18.5 % (ref 12.3–15.4)
ETHANOL BLD-MCNC: <10 MG/DL (ref 0–10)
ETHANOL UR QL: <0.01 %
FLUAV H1 2009 PAND RNA NPH QL NAA+PROBE: NOT DETECTED
FLUAV H1 HA GENE NPH QL NAA+PROBE: NOT DETECTED
FLUAV H3 RNA NPH QL NAA+PROBE: NOT DETECTED
FLUAV SUBTYP SPEC NAA+PROBE: NOT DETECTED
FLUBV RNA ISLT QL NAA+PROBE: NOT DETECTED
GFR SERPL CREATININE-BSD FRML MDRD: 10 ML/MIN/1.73
GFR SERPL CREATININE-BSD FRML MDRD: ABNORMAL ML/MIN/{1.73_M2}
GLUCOSE BLD-MCNC: 44 MG/DL (ref 65–99)
GLUCOSE BLDC GLUCOMTR-MCNC: 126 MG/DL (ref 70–130)
GLUCOSE BLDC GLUCOMTR-MCNC: 140 MG/DL (ref 70–130)
GLUCOSE UR STRIP-MCNC: NEGATIVE MG/DL
GRAN CASTS URNS QL MICRO: ABNORMAL /LPF
HADV DNA SPEC NAA+PROBE: NOT DETECTED
HCO3 BLDA-SCNC: 6 MMOL/L (ref 20–26)
HCO3 BLDA-SCNC: 6 MMOL/L (ref 20–26)
HCOV 229E RNA SPEC QL NAA+PROBE: NOT DETECTED
HCOV HKU1 RNA SPEC QL NAA+PROBE: NOT DETECTED
HCOV NL63 RNA SPEC QL NAA+PROBE: NOT DETECTED
HCOV OC43 RNA SPEC QL NAA+PROBE: NOT DETECTED
HCT VFR BLD AUTO: 27 % (ref 37.5–51)
HCT VFR BLD CALC: 20.2 % (ref 38–51)
HCT VFR BLD CALC: 25.2 % (ref 38–51)
HGB BLD-MCNC: 8.4 G/DL (ref 13–17.7)
HGB BLDA-MCNC: 6.6 G/DL (ref 14–18)
HGB BLDA-MCNC: 8.2 G/DL (ref 14–18)
HGB UR QL STRIP.AUTO: ABNORMAL
HMPV RNA NPH QL NAA+NON-PROBE: NOT DETECTED
HOROWITZ INDEX BLD+IHG-RTO: 100 %
HOROWITZ INDEX BLD+IHG-RTO: 100 %
HPIV1 RNA SPEC QL NAA+PROBE: NOT DETECTED
HPIV2 RNA SPEC QL NAA+PROBE: NOT DETECTED
HPIV3 RNA NPH QL NAA+PROBE: NOT DETECTED
HPIV4 P GENE NPH QL NAA+PROBE: NOT DETECTED
HYALINE CASTS UR QL AUTO: ABNORMAL /LPF
HYPOCHROMIA BLD QL: ABNORMAL
INR PPP: 5.07 (ref 0.9–1.1)
KETONES UR QL STRIP: NEGATIVE
LACTATE HOLD SPECIMEN: NORMAL
LEUKOCYTE ESTERASE UR QL STRIP.AUTO: ABNORMAL
LIPASE SERPL-CCNC: 152 U/L (ref 13–60)
LYMPHOCYTES # BLD MANUAL: 1.28 10*3/MM3 (ref 0.7–3.1)
LYMPHOCYTES NFR BLD MANUAL: 21 % (ref 19.6–45.3)
LYMPHOCYTES NFR BLD MANUAL: 5 % (ref 5–12)
Lab: ABNORMAL
M PNEUMO IGG SER IA-ACNC: NOT DETECTED
MACROCYTES BLD QL SMEAR: ABNORMAL
MAGNESIUM SERPL-MCNC: 1.9 MG/DL (ref 1.6–2.4)
MCH RBC QN AUTO: 33.7 PG (ref 26.6–33)
MCHC RBC AUTO-ENTMCNC: 31.1 G/DL (ref 31.5–35.7)
MCV RBC AUTO: 108.4 FL (ref 79–97)
METHADONE UR QL SCN: NEGATIVE
METHGB BLD QL: 0.3 % (ref 0–3)
METHGB BLD QL: 0.6 % (ref 0–3)
MODALITY: ABNORMAL
MODALITY: ABNORMAL
MONOCYTES # BLD AUTO: 0.3 10*3/MM3 (ref 0.1–0.9)
NEUTROPHILS # BLD AUTO: 4.45 10*3/MM3 (ref 1.7–7)
NEUTROPHILS NFR BLD MANUAL: 73 % (ref 42.7–76)
NITRITE UR QL STRIP: POSITIVE
NOTE: ABNORMAL
NOTE: ABNORMAL
NOTIFIED BY: ABNORMAL
NOTIFIED BY: ABNORMAL
NOTIFIED WHO: ABNORMAL
NOTIFIED WHO: ABNORMAL
NRBC SPEC MANUAL: 1 /100 WBC (ref 0–0.2)
NT-PROBNP SERPL-MCNC: 1422 PG/ML (ref 5–900)
OPIATES UR QL: NEGATIVE
OXYCODONE UR QL SCN: NEGATIVE
OXYHGB MFR BLDV: 98 % (ref 94–99)
OXYHGB MFR BLDV: 98.3 % (ref 94–99)
PCO2 BLDA: 25.8 MM HG (ref 35–45)
PCO2 BLDA: 36.7 MM HG (ref 35–45)
PCO2 TEMP ADJ BLD: ABNORMAL MM[HG]
PCO2 TEMP ADJ BLD: ABNORMAL MM[HG]
PCP UR QL SCN: NEGATIVE
PEEP RESPIRATORY: 5 CM[H2O]
PEEP RESPIRATORY: 5 CM[H2O]
PH BLDA: 6.83 PH UNITS (ref 7.35–7.45)
PH BLDA: 6.97 PH UNITS (ref 7.35–7.45)
PH UR STRIP.AUTO: <=5 [PH] (ref 5–8)
PH, TEMP CORRECTED: ABNORMAL
PH, TEMP CORRECTED: ABNORMAL
PLATELET # BLD AUTO: 60 10*3/MM3 (ref 140–450)
PMV BLD AUTO: 14.1 FL (ref 6–12)
PO2 BLDA: 331 MM HG (ref 83–108)
PO2 BLDA: 367 MM HG (ref 83–108)
PO2 TEMP ADJ BLD: ABNORMAL MM[HG]
PO2 TEMP ADJ BLD: ABNORMAL MM[HG]
POTASSIUM BLD-SCNC: 4.6 MMOL/L (ref 3.5–5.2)
PROT SERPL-MCNC: 5.7 G/DL (ref 6–8.5)
PROT UR QL STRIP: ABNORMAL
PROTHROMBIN TIME: 49.2 SECONDS (ref 11–15.4)
RBC # BLD AUTO: 2.49 10*6/MM3 (ref 4.14–5.8)
RBC # UR: ABNORMAL /HPF
REF LAB TEST METHOD: ABNORMAL
RENAL EPI CELLS #/AREA URNS HPF: ABNORMAL /HPF
RH BLD: POSITIVE
RHINOVIRUS RNA SPEC NAA+PROBE: NOT DETECTED
RSV RNA NPH QL NAA+NON-PROBE: NOT DETECTED
SALICYLATES SERPL-MCNC: 0.5 MG/DL
SAO2 % BLDCOA: 99 % (ref 94–99)
SAO2 % BLDCOA: >99.2 % (ref 94–99)
SCAN SLIDE: NORMAL
SCHISTOCYTES BLD QL SMEAR: ABNORMAL
SET MECH RESP RATE: 20
SET MECH RESP RATE: 20
SMALL PLATELETS BLD QL SMEAR: ABNORMAL
SODIUM BLD-SCNC: 138 MMOL/L (ref 136–145)
SP GR UR STRIP: 1.01 (ref 1–1.03)
SQUAMOUS #/AREA URNS HPF: ABNORMAL /HPF
T&S EXPIRATION DATE: NORMAL
TROPONIN T SERPL-MCNC: 0.05 NG/ML (ref 0–0.03)
TROPONIN T SERPL-MCNC: 0.05 NG/ML (ref 0–0.03)
UROBILINOGEN UR QL STRIP: ABNORMAL
VENTILATOR MODE: ABNORMAL
VENTILATOR MODE: ABNORMAL
VT ON VENT VENT: 500 ML
VT ON VENT VENT: 500 ML
WBC NRBC COR # BLD: 6.09 10*3/MM3 (ref 3.4–10.8)
WBC UR QL AUTO: ABNORMAL /HPF

## 2020-04-20 PROCEDURE — 80307 DRUG TEST PRSMV CHEM ANLYZR: CPT | Performed by: EMERGENCY MEDICINE

## 2020-04-20 PROCEDURE — 31500 INSERT EMERGENCY AIRWAY: CPT

## 2020-04-20 PROCEDURE — 85610 PROTHROMBIN TIME: CPT | Performed by: EMERGENCY MEDICINE

## 2020-04-20 PROCEDURE — 83735 ASSAY OF MAGNESIUM: CPT | Performed by: EMERGENCY MEDICINE

## 2020-04-20 PROCEDURE — 94799 UNLISTED PULMONARY SVC/PX: CPT

## 2020-04-20 PROCEDURE — 82375 ASSAY CARBOXYHB QUANT: CPT

## 2020-04-20 PROCEDURE — 81001 URINALYSIS AUTO W/SCOPE: CPT | Performed by: EMERGENCY MEDICINE

## 2020-04-20 PROCEDURE — 70450 CT HEAD/BRAIN W/O DYE: CPT

## 2020-04-20 PROCEDURE — 96365 THER/PROPH/DIAG IV INF INIT: CPT

## 2020-04-20 PROCEDURE — P9016 RBC LEUKOCYTES REDUCED: HCPCS

## 2020-04-20 PROCEDURE — 96368 THER/DIAG CONCURRENT INF: CPT

## 2020-04-20 PROCEDURE — 25010000002 VANCOMYCIN: Performed by: EMERGENCY MEDICINE

## 2020-04-20 PROCEDURE — 85025 COMPLETE CBC W/AUTO DIFF WBC: CPT | Performed by: EMERGENCY MEDICINE

## 2020-04-20 PROCEDURE — 99291 CRITICAL CARE FIRST HOUR: CPT

## 2020-04-20 PROCEDURE — 96367 TX/PROPH/DG ADDL SEQ IV INF: CPT

## 2020-04-20 PROCEDURE — 85007 BL SMEAR W/DIFF WBC COUNT: CPT | Performed by: EMERGENCY MEDICINE

## 2020-04-20 PROCEDURE — 86900 BLOOD TYPING SEROLOGIC ABO: CPT | Performed by: EMERGENCY MEDICINE

## 2020-04-20 PROCEDURE — C1751 CATH, INF, PER/CENT/MIDLINE: HCPCS

## 2020-04-20 PROCEDURE — 86920 COMPATIBILITY TEST SPIN: CPT

## 2020-04-20 PROCEDURE — 83605 ASSAY OF LACTIC ACID: CPT | Performed by: EMERGENCY MEDICINE

## 2020-04-20 PROCEDURE — 93005 ELECTROCARDIOGRAM TRACING: CPT | Performed by: EMERGENCY MEDICINE

## 2020-04-20 PROCEDURE — 84484 ASSAY OF TROPONIN QUANT: CPT | Performed by: EMERGENCY MEDICINE

## 2020-04-20 PROCEDURE — 87040 BLOOD CULTURE FOR BACTERIA: CPT | Performed by: EMERGENCY MEDICINE

## 2020-04-20 PROCEDURE — 87086 URINE CULTURE/COLONY COUNT: CPT | Performed by: EMERGENCY MEDICINE

## 2020-04-20 PROCEDURE — 36600 WITHDRAWAL OF ARTERIAL BLOOD: CPT

## 2020-04-20 PROCEDURE — 82140 ASSAY OF AMMONIA: CPT | Performed by: EMERGENCY MEDICINE

## 2020-04-20 PROCEDURE — 80076 HEPATIC FUNCTION PANEL: CPT | Performed by: EMERGENCY MEDICINE

## 2020-04-20 PROCEDURE — 51702 INSERT TEMP BLADDER CATH: CPT

## 2020-04-20 PROCEDURE — 71045 X-RAY EXAM CHEST 1 VIEW: CPT

## 2020-04-20 PROCEDURE — 92950 HEART/LUNG RESUSCITATION CPR: CPT

## 2020-04-20 PROCEDURE — 83050 HGB METHEMOGLOBIN QUAN: CPT

## 2020-04-20 PROCEDURE — 86900 BLOOD TYPING SEROLOGIC ABO: CPT

## 2020-04-20 PROCEDURE — 25010000002 EPINEPHRINE 5 MG/250 ML: Performed by: EMERGENCY MEDICINE

## 2020-04-20 PROCEDURE — 83690 ASSAY OF LIPASE: CPT | Performed by: EMERGENCY MEDICINE

## 2020-04-20 PROCEDURE — 25010000002 PIPERACILLIN-TAZOBACTAM: Performed by: EMERGENCY MEDICINE

## 2020-04-20 PROCEDURE — 82962 GLUCOSE BLOOD TEST: CPT

## 2020-04-20 PROCEDURE — 85730 THROMBOPLASTIN TIME PARTIAL: CPT | Performed by: EMERGENCY MEDICINE

## 2020-04-20 PROCEDURE — 36430 TRANSFUSION BLD/BLD COMPNT: CPT

## 2020-04-20 PROCEDURE — 82805 BLOOD GASES W/O2 SATURATION: CPT

## 2020-04-20 PROCEDURE — 96375 TX/PRO/DX INJ NEW DRUG ADDON: CPT

## 2020-04-20 PROCEDURE — 80048 BASIC METABOLIC PNL TOTAL CA: CPT | Performed by: EMERGENCY MEDICINE

## 2020-04-20 PROCEDURE — 25010000002 EPINEPHRINE PF 1 MG/10ML SOLUTION PREFILLED SYRINGE: Performed by: EMERGENCY MEDICINE

## 2020-04-20 PROCEDURE — 0099U HC BIOFIRE FILMARRAY RESP PANEL 1: CPT | Performed by: EMERGENCY MEDICINE

## 2020-04-20 PROCEDURE — 94002 VENT MGMT INPAT INIT DAY: CPT

## 2020-04-20 PROCEDURE — 86901 BLOOD TYPING SEROLOGIC RH(D): CPT | Performed by: EMERGENCY MEDICINE

## 2020-04-20 PROCEDURE — 86140 C-REACTIVE PROTEIN: CPT | Performed by: EMERGENCY MEDICINE

## 2020-04-20 PROCEDURE — 86901 BLOOD TYPING SEROLOGIC RH(D): CPT

## 2020-04-20 PROCEDURE — 99292 CRITICAL CARE ADDL 30 MIN: CPT

## 2020-04-20 PROCEDURE — 25010000002 OCTREOTIDE PER 25 MCG: Performed by: EMERGENCY MEDICINE

## 2020-04-20 PROCEDURE — 93010 ELECTROCARDIOGRAM REPORT: CPT | Performed by: INTERNAL MEDICINE

## 2020-04-20 PROCEDURE — 86850 RBC ANTIBODY SCREEN: CPT | Performed by: EMERGENCY MEDICINE

## 2020-04-20 PROCEDURE — 83880 ASSAY OF NATRIURETIC PEPTIDE: CPT | Performed by: EMERGENCY MEDICINE

## 2020-04-20 PROCEDURE — 25010000002 EPINEPHRINE 1 MG/10ML SOLUTION PREFILLED SYRINGE

## 2020-04-20 PROCEDURE — 96366 THER/PROPH/DIAG IV INF ADDON: CPT

## 2020-04-20 RX ORDER — NOREPINEPHRINE BIT/0.9 % NACL 8 MG/250ML
.02-.3 INFUSION BOTTLE (ML) INTRAVENOUS
Status: DISCONTINUED | OUTPATIENT
Start: 2020-04-20 | End: 2020-04-20 | Stop reason: HOSPADM

## 2020-04-20 RX ORDER — DEXTROSE AND SODIUM CHLORIDE 5; .45 G/100ML; G/100ML
125 INJECTION, SOLUTION INTRAVENOUS CONTINUOUS
Status: DISCONTINUED | OUTPATIENT
Start: 2020-04-20 | End: 2020-04-20 | Stop reason: HOSPADM

## 2020-04-20 RX ORDER — PANTOPRAZOLE SODIUM 40 MG/10ML
80 INJECTION, POWDER, LYOPHILIZED, FOR SOLUTION INTRAVENOUS ONCE
Status: COMPLETED | OUTPATIENT
Start: 2020-04-20 | End: 2020-04-20

## 2020-04-20 RX ORDER — MIDAZOLAM HCL IN 0.9 % NACL/PF 1 MG/ML
1-10 PLASTIC BAG, INJECTION (ML) INTRAVENOUS
Status: DISCONTINUED | OUTPATIENT
Start: 2020-04-20 | End: 2020-04-20 | Stop reason: HOSPADM

## 2020-04-20 RX ORDER — DEXTROSE MONOHYDRATE 25 G/50ML
25 INJECTION, SOLUTION INTRAVENOUS ONCE
Status: COMPLETED | OUTPATIENT
Start: 2020-04-20 | End: 2020-04-20

## 2020-04-20 RX ORDER — SODIUM CHLORIDE 0.9 % (FLUSH) 0.9 %
10 SYRINGE (ML) INJECTION AS NEEDED
Status: DISCONTINUED | OUTPATIENT
Start: 2020-04-20 | End: 2020-04-20 | Stop reason: HOSPADM

## 2020-04-20 RX ADMIN — EPINEPHRINE 0.02 MCG/KG/MIN: 1 INJECTION PARENTERAL at 06:00

## 2020-04-20 RX ADMIN — OCTREOTIDE ACETATE 25 MCG/HR: 500 INJECTION, SOLUTION INTRAVENOUS; SUBCUTANEOUS at 07:41

## 2020-04-20 RX ADMIN — DEXTROSE AND SODIUM CHLORIDE 125 ML/HR: 5; 450 INJECTION, SOLUTION INTRAVENOUS at 07:46

## 2020-04-20 RX ADMIN — PANTOPRAZOLE SODIUM 8 MG/HR: 40 INJECTION, POWDER, FOR SOLUTION INTRAVENOUS at 06:15

## 2020-04-20 RX ADMIN — VANCOMYCIN HYDROCHLORIDE 1750 MG: 1 INJECTION, POWDER, LYOPHILIZED, FOR SOLUTION INTRAVENOUS at 05:02

## 2020-04-20 RX ADMIN — SODIUM BICARBONATE 50 MEQ: 84 INJECTION, SOLUTION INTRAVENOUS at 03:50

## 2020-04-20 RX ADMIN — SODIUM BICARBONATE 150 MEQ: 84 INJECTION, SOLUTION INTRAVENOUS at 05:20

## 2020-04-20 RX ADMIN — SODIUM BICARBONATE 50 MEQ: 84 INJECTION, SOLUTION INTRAVENOUS at 04:15

## 2020-04-20 RX ADMIN — NOREPINEPHRINE BITARTRATE 0.1 MCG/KG/MIN: 1 INJECTION, SOLUTION, CONCENTRATE INTRAVENOUS at 05:35

## 2020-04-20 RX ADMIN — EPINEPHRINE 1 MG: 0.1 INJECTION, SOLUTION ENDOTRACHEAL; INTRACARDIAC; INTRAVENOUS at 03:43

## 2020-04-20 RX ADMIN — Medication 1 MG/HR: at 05:02

## 2020-04-20 RX ADMIN — EPINEPHRINE 1 MG: 0.1 INJECTION, SOLUTION ENDOTRACHEAL; INTRACARDIAC; INTRAVENOUS at 03:48

## 2020-04-20 RX ADMIN — PIPERACILLIN SODIUM,TAZOBACTAM SODIUM 3.38 G: 3; .375 INJECTION, POWDER, FOR SOLUTION INTRAVENOUS at 04:25

## 2020-04-20 RX ADMIN — SODIUM CHLORIDE 1000 ML: 9 INJECTION, SOLUTION INTRAVENOUS at 04:54

## 2020-04-20 RX ADMIN — PANTOPRAZOLE SODIUM 80 MG: 40 INJECTION, POWDER, FOR SOLUTION INTRAVENOUS at 06:15

## 2020-04-20 RX ADMIN — DEXTROSE MONOHYDRATE 25 G: 25 INJECTION, SOLUTION INTRAVENOUS at 05:02

## 2020-04-20 NOTE — ED NOTES
Emergency blood started at this time, VORB per Dr. Cates to start blood at 999mL/hr. Unit number [id number], Product code [id number], Unit experation date 05/15/2020, Donor type is O Positive. Witnessed by Lachelle Andrade RN. Vital signs prior to blood administration are: Temp: 96; B/P: 80/42; Pulse 48; Respirations are 20 (ventilated).      Iliana Barron RN  04/20/20 0924

## 2020-04-20 NOTE — ED NOTES
No answer at this time. Left voicemail on sisters (Kamala) number at this time.      Tiffany Birch RN  04/20/20 8175

## 2020-04-20 NOTE — ED NOTES
Called Jovita LEE back at this time, made aware that [facility] will call and will come directly to the hospital to  patient at 7 am. Provider and lead aware     Savita Santacruz RN  04/20/20 0661

## 2020-04-20 NOTE — ED NOTES
Pt's daughter (Iwona Bryan) called at this time. Iwona explains that she was who called 911 for pt and understands pt's condition and plan to transfer to . Iwona also explains at this time that pt's sister (Kamala Mejia) is not to know anything else about the pt, explained that I had already spoken with Kamala and that we were unaware of her not knowing information and we were not able to reach any other family. Iwona verbalizes understanding and states that she and pt's girlfriend (Maria Luisa Murcia) is the only people to know about pt's condition. Elva, RN at  made aware. Iwona Bryan may be reached at [phone] and Maria Luisa may be reached at [phone]     Iliana Barron RN  04/20/20 1429

## 2020-04-20 NOTE — ED PROVIDER NOTES
Subjective   59-year-old male with history of liver disease presenting with respiratory distress and altered mental status.  Found to be in cardiac arrest on arrival.  Initial rhythm was bradycardic PEA.  We initiated ACLS resuscitation.  Patient was intubated during the code.  He received several rounds of chest compressions and 2 doses of epinephrine as well as calcium and bicarb and had ROSC.  Further history limited due to acuity and no family available.  However, chart review shows he was here earlier for pneumonia and signed out AMA.      History provided by:  EMS personnel  History limited by:  Acuity of condition   used: No        Review of Systems   Unable to perform ROS: Acuity of condition       Past Medical History:   Diagnosis Date   • Diabetes mellitus (CMS/HCC)    • Heart murmur    • Hypertension        Allergies   Allergen Reactions   • Morphine Other (See Comments)     PATIENT STATES FLATLINED       No past surgical history on file.    Family History   Problem Relation Age of Onset   • Alzheimer's disease Mother    • Heart disease Father    • Diabetes Father    • Stroke Father        Social History     Socioeconomic History   • Marital status:      Spouse name: Not on file   • Number of children: Not on file   • Years of education: Not on file   • Highest education level: Not on file   Tobacco Use   • Smoking status: Current Every Day Smoker     Packs/day: 1.00     Years: 40.00     Pack years: 40.00     Types: Cigarettes   • Smokeless tobacco: Never Used   Substance and Sexual Activity   • Alcohol use: Yes     Frequency: Never   • Drug use: No   • Sexual activity: Defer           Objective   Physical Exam   Constitutional: He appears distressed.   HENT:   Head: Normocephalic and atraumatic.   Right Ear: External ear normal.   Left Ear: External ear normal.   Copious secretions   Eyes:   Pupils constricted, conjunctivae icteric   Neck: Neck supple. No tracheal deviation  present.   Cardiovascular:   No spontaneous cardiac activity  No central or peripheral pulses   Abdominal: Soft. He exhibits distension.   Not tense   Musculoskeletal: He exhibits edema. He exhibits no deformity.   Neurological:   Unresponsive to stimulus   Skin: Skin is warm. Bruising noted. He is diaphoretic.   Jaundiced   Psychiatric:   Unresponsive       Intubation  Date/Time: 4/20/2020 5:50 AM  Performed by: Sergei Cates MD  Authorized by: Sergei Cates MD     Consent:     Consent obtained:  Emergent situation  Pre-procedure details:     Patient status:  Unresponsive    Mallampati score:  II  Procedure details:     Preoxygenation:  Nonrebreather mask    CPR in progress: yes      Intubation method:  Oral    Oral intubation technique:  Video-assisted    Laryngoscope blade:  Mac 4    Tube size (mm):  8.0    Tube type:  Cuffed    Number of attempts:  1    Tube visualized through cords: yes    Placement assessment:     Tube secured with:  ETT sosa    Breath sounds:  Equal and absent over the epigastrium    Placement verification: chest rise, CXR verification, direct visualization, equal breath sounds and ETCO2 detector      CXR findings:  ETT in proper place  Post-procedure details:     Patient tolerance of procedure:  Tolerated well, no immediate complications    Central Line At Bedside  Date/Time: 4/20/2020 6:10 AM  Performed by: Sergei Cates MD  Authorized by: Sergei Cates MD     Consent:     Consent obtained:  Emergent situation  Pre-procedure details:     Hand hygiene: Hand hygiene performed prior to insertion      Sterile barrier technique: All elements of maximal sterile technique followed      Skin preparation:  2% chlorhexidine    Skin preparation agent: Skin preparation agent completely dried prior to procedure    Procedure details:     Location:  L internal jugular    Patient position:  Flat    Procedural supplies:  Triple lumen    Landmarks  identified: yes      Ultrasound guidance: yes      Sterile ultrasound techniques: Sterile gel and sterile probe covers were used      Number of attempts:  1    Successful placement: yes    Post-procedure details:     Post-procedure:  Dressing applied and line sutured    Assessment:  Blood return through all ports, no pneumothorax on x-ray, placement verified by x-ray and free fluid flow    Patient tolerance of procedure:  Tolerated well, no immediate complications           EKG: nonspecific ST and T waves changes, atrial fibrillation, rate 92.      ED Course                                           MDM  Number of Diagnoses or Management Options  Acute renal failure superimposed on chronic kidney disease, unspecified CKD stage, unspecified acute renal failure type (CMS/HCC):   Cardiac arrest (CMS/HCC):   Gastrointestinal hemorrhage, unspecified gastrointestinal hemorrhage type:   Liver failure with hepatic coma, unspecified chronicity (CMS/HCC):   Pneumonia of left lower lobe due to infectious organism (CMS/HCC):   Diagnosis management comments: 59-year-old male presenting with cardiac arrest.  Initial rhythm was bradycardic PEA.  See nursing notes for full details of the resuscitation.  Patient received 2 doses of epinephrine as well as calcium and bicarb.  His first two pulse checks were asystole but he had ROSC on third pulse check.  Afterwards he became progressively more bradycardiac and hypotensive and was started first on levophed and then epinephrine gtt.  Left IJ CVC was placed.  Blood gas showed worsening anemia and OG tube returned dark bloody stomach contents so blood was ordered for GIB.  He has received broad-spectrum antibiotics and Protonix + octreotide as well as sodium bicarbonate.    Patient not accompanied by any family.  As best we can tell he is full code per chart review and discussion with sister.  We have been unable to reach his daughter who is next of kin and would be the decision maker in  this instance.    Prognosis appears dismal given hepatic and renal failure in the setting of cardiac arrest, pneumonia, GI bleed.  He will absolutely need transfer to higher level of care if care is to be continued.  He has been accepted to the ICU at  by Dr. Villalobos.    Arranging transport has been difficult due to lack of EMS staff and poor weather for flying.  Patient declined by [facility], accepted by [facility] but they are unable to come to get him.  We will have to get him to [place] by ground.  We are trying to arrange a nurse to ride along with EMS.       Amount and/or Complexity of Data Reviewed  Clinical lab tests: ordered and reviewed  Tests in the radiology section of CPT®: ordered and reviewed  Discuss the patient with other providers: yes  Independent visualization of images, tracings, or specimens: yes    Critical Care  Total time providing critical care: 30-74 minutes (32)      Final diagnoses:   Cardiac arrest (CMS/HCC)   Liver failure with hepatic coma, unspecified chronicity (CMS/HCC)   Acute renal failure superimposed on chronic kidney disease, unspecified CKD stage, unspecified acute renal failure type (CMS/HCC)   Gastrointestinal hemorrhage, unspecified gastrointestinal hemorrhage type   Pneumonia of left lower lobe due to infectious organism (CMS/HCC)            Sergei Cates MD  04/20/20 0752

## 2020-04-20 NOTE — ED NOTES
Spoke with  bed board at this time, received bed assignment on pt, primary RN made aware.     Lachelle Andrade, RN  04/20/20 0624

## 2020-04-20 NOTE — ED NOTES
Notified EMS that [facility] accepted transfer at this time     Savita Santacruz, JORDON  04/20/20 0717

## 2020-04-20 NOTE — ED NOTES
Called patients sister at this time to update and no answer      Tiffany Birch, RN  04/20/20 6872

## 2020-04-20 NOTE — ED NOTES
Pt's second unit of emergency blood finished at this time. 300mL total infused at this time. Post-transfusion vital signs are: temp: 95.1; blood pressure: 120/64; HR 53; respirations 20 (ventilatoed). Pt has no signs of reactions at this time. Will continue to monitor pt.      Iliana Barron RN  04/20/20 3680

## 2020-04-20 NOTE — ED NOTES
Spoke with EMS dispatch at this time requesting [facility], states that he will call back in a few minutes.      Savita Santacruz RN  04/20/20 0701

## 2020-04-20 NOTE — ED NOTES
Pt's first unit of emergency blood is administered at this time. Volume is 300mL. Pt shows no signs of any reactions. Vital signs post blood administration are: Temp: 94.9 (pt on bear hugger); Blood pressure 89/52, pulse: 49, and respirations are 20 (ventilated).      Iliana Barron RN  04/20/20 0912

## 2020-04-20 NOTE — ED NOTES
Kamala Mejia (pt's sister) called at this time, explained to her pt's condition. Pt's sister verbalizes understanding and all questions answered. Asked if she had a phone number for pt's daughter (next of kin) and she states she does not have a recent number.      Iliana Barron, RN  04/20/20 6428

## 2020-04-20 NOTE — ED NOTES
Spoke with [facility] dispatch advised them we have ground crew coming to transport pt to airport with a eta to our location of apro[place]imee 15-20     Curtis Crane  04/20/20 0867

## 2020-04-20 NOTE — ED NOTES
Administering pt's second unit of emergency blood at this time. Unit number: [id number], Product code: [id number], Unit expiration: 05/15/2020, donor type O Positive. Starting unit at 999mL/hr per [facility] per Dr. Cates, administration witness by Lachelle Andrade RN. Pre-transfusion vital signs are: temp: 94.9, Blood pressure: 93/59, pulse: 49, respirations are 20 (ventilated). Will continue to monitor blood administration.      Iliana Barron RN  04/20/20 5157

## 2020-04-20 NOTE — ED NOTES
Pt arrived and moved from EMS stretcher to ED stretcher at this time, pt unresponsive, pulse check performed by provider, no pulse noted, CPR initiated.  Pt placed on zolls monitor and cardiac monitor and prepared for intubation.     Lachelle Andrade RN  04/20/20 0536

## 2020-04-20 NOTE — ED NOTES
"Oncology Rooming Note    September 24, 2019 2:25 PM   Hiram Tapia is a 60 year old male who presents for:    Chief Complaint   Patient presents with     Oncology Clinic Visit     New; Pleural Effusion     Initial Vitals: /69   Pulse 96   Temp 98.4  F (36.9  C) (Oral)   Resp 16   Wt 87.1 kg (192 lb)   SpO2 98%   BMI 27.55 kg/m   Estimated body mass index is 27.55 kg/m  as calculated from the following:    Height as of 8/13/19: 1.778 m (5' 10\").    Weight as of this encounter: 87.1 kg (192 lb). Body surface area is 2.07 meters squared.  No Pain (0) Comment: Data Unavailable   No LMP for male patient.  Allergies reviewed: Yes  Medications reviewed: Yes    Medications: Medication refills not needed today.  Pharmacy name entered into EPIC:    [facility] [place], [place] - [address] [place] [address] [phone]  [id number] [place] [address] AT [facility] OF  [address]    Clinical concerns: CT scan clarification.        Kathryn Lui CMA              " Spoke with patients sister Kamala. Informed that patient is in ER and of his condition. No working number for next of Kin his Daughter. Kamala doesn't know his daughters number and is trying to find it at this time.              Tiffany Birch RN  04/20/20 7547

## 2020-04-20 NOTE — ED NOTES
[facility] called back and states that now they will need an ambulance service to take the patient to [facility].      Savita Santacruz RN  04/20/20 0701

## 2020-04-20 NOTE — ED NOTES
[facility] accepted at this time to transport patient to airport in [facility], Savita White RN  04/20/20 0714

## 2020-04-20 NOTE — ED NOTES
Tried to call and update patients sister; not a working number. Left message with sister.      Tiffany Birch, RN  04/20/20 0525

## 2020-04-20 NOTE — ED NOTES
Pt's vital signs during blood administration at this time are: Temp: 95, blood pressure: 123/64, pulse: 51, and respirations are 20 (ventilated) Pt has no signs of any reactions at this time. Will continue to monitor.      Iliana Barron RN  04/20/20 1012

## 2020-04-20 NOTE — ED NOTES
KARLOSB per Dr. Cates to begin Epi drip at this time and to titrate Levophed as the Epi is titrated up.      Iliana Barron RN  04/20/20 9572

## 2020-04-20 NOTE — ED NOTES
[facility] calling back for accepting patient.      Tiffany Birch, RN  04/20/20 0645

## 2020-04-21 LAB
BACTERIA SPEC AEROBE CULT: NO GROWTH
BACTERIA SPEC AEROBE CULT: NORMAL

## 2020-04-24 LAB — BACTERIA SPEC AEROBE CULT: NORMAL

## 2020-04-25 LAB
BACTERIA SPEC AEROBE CULT: NORMAL
BACTERIA SPEC AEROBE CULT: NORMAL

## 2020-05-03 ENCOUNTER — HOSPITAL ENCOUNTER (EMERGENCY)
Facility: HOSPITAL | Age: 60
End: 2020-05-03

## 2022-01-26 NOTE — ED NOTES
Pt c/o of pain and discomfort in heels. Bilateral heels with blanchable redness. Mepilex dressings applied to bilateral heels and heel elevated off bed with pillow. Pt voiced comfort. Talked to pt's sister, updated her on the plan of care at this time.     Shannan Maciel, RN  04/03/20 5551

## 2022-07-12 NOTE — ED NOTES
[facility] Tiffany STUART RN  04/20/20 0612     stated